# Patient Record
Sex: MALE | Race: BLACK OR AFRICAN AMERICAN | Employment: OTHER | URBAN - METROPOLITAN AREA
[De-identification: names, ages, dates, MRNs, and addresses within clinical notes are randomized per-mention and may not be internally consistent; named-entity substitution may affect disease eponyms.]

---

## 2018-12-07 ENCOUNTER — APPOINTMENT (OUTPATIENT)
Dept: CT IMAGING | Age: 58
End: 2018-12-07
Attending: NURSE PRACTITIONER
Payer: COMMERCIAL

## 2018-12-07 ENCOUNTER — HOSPITAL ENCOUNTER (EMERGENCY)
Age: 58
Discharge: HOME | End: 2018-12-07
Attending: EMERGENCY MEDICINE | Admitting: EMERGENCY MEDICINE
Payer: COMMERCIAL

## 2018-12-07 ENCOUNTER — APPOINTMENT (OUTPATIENT)
Dept: GENERAL RADIOLOGY | Age: 58
End: 2018-12-07
Attending: NURSE PRACTITIONER
Payer: COMMERCIAL

## 2018-12-07 VITALS
SYSTOLIC BLOOD PRESSURE: 127 MMHG | HEART RATE: 77 BPM | OXYGEN SATURATION: 97 % | BODY MASS INDEX: 26.22 KG/M2 | RESPIRATION RATE: 14 BRPM | WEIGHT: 177 LBS | HEIGHT: 69 IN | TEMPERATURE: 97.6 F | DIASTOLIC BLOOD PRESSURE: 90 MMHG
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
## 2018-12-07 DIAGNOSIS — S09.90XA INJURY OF HEAD, INITIAL ENCOUNTER: ICD-10-CM

## 2018-12-07 DIAGNOSIS — R40.20 LOSS OF CONSCIOUSNESS (HCC): ICD-10-CM

## 2018-12-07 PROCEDURE — 93005 ELECTROCARDIOGRAM TRACING: CPT

## 2018-12-07 PROCEDURE — 99284 EMERGENCY DEPT VISIT MOD MDM: CPT

## 2018-12-07 PROCEDURE — 71046 X-RAY EXAM CHEST 2 VIEWS: CPT

## 2018-12-07 PROCEDURE — 70450 CT HEAD/BRAIN W/O DYE: CPT

## 2018-12-08 LAB
ATRIAL RATE: 69 BPM
CALCULATED P AXIS, ECG09: 37 DEGREES
CALCULATED R AXIS, ECG10: 14 DEGREES
CALCULATED T AXIS, ECG11: 32 DEGREES
DIAGNOSIS, 93000: NORMAL
P-R INTERVAL, ECG05: 168 MS
Q-T INTERVAL, ECG07: 396 MS
QRS DURATION, ECG06: 64 MS
QTC CALCULATION (BEZET), ECG08: 424 MS
VENTRICULAR RATE, ECG03: 69 BPM
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
## 2022-12-27 ENCOUNTER — APPOINTMENT (OUTPATIENT)
Dept: GENERAL RADIOLOGY | Age: 62
End: 2022-12-27
Attending: EMERGENCY MEDICINE
Payer: COMMERCIAL

## 2022-12-27 ENCOUNTER — HOSPITAL ENCOUNTER (EMERGENCY)
Age: 62
Discharge: HOME | End: 2022-12-28
Attending: EMERGENCY MEDICINE
Payer: COMMERCIAL

## 2022-12-27 DIAGNOSIS — R07.81 PLEURITIC CHEST PAIN: Primary | ICD-10-CM

## 2022-12-27 LAB
BASOPHILS # BLD: 0.1 K/UL (ref 0–0.1)
BASOPHILS NFR BLD: 1 % (ref 0–1)
COMMENT, HOLDF: NORMAL
D DIMER PPP FEU-MCNC: 2.24 MG/L FEU (ref 0–0.65)
DIFFERENTIAL METHOD BLD: ABNORMAL
EOSINOPHIL # BLD: 0.5 K/UL (ref 0–0.4)
EOSINOPHIL NFR BLD: 7 % (ref 0–7)
ERYTHROCYTE [DISTWIDTH] IN BLOOD BY AUTOMATED COUNT: 16.1 % (ref 11.5–14.5)
HCT VFR BLD AUTO: 47.4 % (ref 36.6–50.3)
HGB BLD-MCNC: 14.5 G/DL (ref 12.1–17)
IMM GRANULOCYTES # BLD AUTO: 0.1 K/UL (ref 0–0.04)
IMM GRANULOCYTES NFR BLD AUTO: 1 % (ref 0–0.5)
LYMPHOCYTES # BLD: 1.1 K/UL (ref 0.8–3.5)
LYMPHOCYTES NFR BLD: 13 % (ref 12–49)
MCH RBC QN AUTO: 27.5 PG (ref 26–34)
MCHC RBC AUTO-ENTMCNC: 30.6 G/DL (ref 30–36.5)
MCV RBC AUTO: 89.8 FL (ref 80–99)
MONOCYTES # BLD: 0.7 K/UL (ref 0–1)
MONOCYTES NFR BLD: 9 % (ref 5–13)
NEUTS SEG # BLD: 5.7 K/UL (ref 1.8–8)
NEUTS SEG NFR BLD: 69 % (ref 32–75)
NRBC # BLD: 0 K/UL (ref 0–0.01)
NRBC BLD-RTO: 0 PER 100 WBC
PLATELET # BLD AUTO: 400 K/UL (ref 150–400)
PMV BLD AUTO: 9.2 FL (ref 8.9–12.9)
RBC # BLD AUTO: 5.28 M/UL (ref 4.1–5.7)
SAMPLES BEING HELD,HOLD: NORMAL
WBC # BLD AUTO: 8.1 K/UL (ref 4.1–11.1)

## 2022-12-27 PROCEDURE — 93005 ELECTROCARDIOGRAM TRACING: CPT

## 2022-12-27 PROCEDURE — 83880 ASSAY OF NATRIURETIC PEPTIDE: CPT

## 2022-12-27 PROCEDURE — 99285 EMERGENCY DEPT VISIT HI MDM: CPT

## 2022-12-27 PROCEDURE — 85025 COMPLETE CBC W/AUTO DIFF WBC: CPT

## 2022-12-27 PROCEDURE — 96374 THER/PROPH/DIAG INJ IV PUSH: CPT

## 2022-12-27 PROCEDURE — 80053 COMPREHEN METABOLIC PANEL: CPT

## 2022-12-27 PROCEDURE — 84484 ASSAY OF TROPONIN QUANT: CPT

## 2022-12-27 PROCEDURE — 74011250636 HC RX REV CODE- 250/636: Performed by: EMERGENCY MEDICINE

## 2022-12-27 PROCEDURE — 36415 COLL VENOUS BLD VENIPUNCTURE: CPT

## 2022-12-27 PROCEDURE — 85379 FIBRIN DEGRADATION QUANT: CPT

## 2022-12-27 PROCEDURE — 71045 X-RAY EXAM CHEST 1 VIEW: CPT

## 2022-12-27 RX ORDER — MORPHINE SULFATE 4 MG/ML
4 INJECTION INTRAVENOUS
Status: COMPLETED | OUTPATIENT
Start: 2022-12-27 | End: 2022-12-27

## 2022-12-27 RX ADMIN — MORPHINE SULFATE 4 MG: 4 INJECTION INTRAVENOUS at 23:23

## 2022-12-28 ENCOUNTER — APPOINTMENT (OUTPATIENT)
Dept: CT IMAGING | Age: 62
End: 2022-12-28
Attending: EMERGENCY MEDICINE
Payer: COMMERCIAL

## 2022-12-28 VITALS
WEIGHT: 165 LBS | DIASTOLIC BLOOD PRESSURE: 84 MMHG | HEIGHT: 69 IN | BODY MASS INDEX: 24.44 KG/M2 | HEART RATE: 91 BPM | TEMPERATURE: 97.7 F | RESPIRATION RATE: 24 BRPM | SYSTOLIC BLOOD PRESSURE: 108 MMHG | OXYGEN SATURATION: 96 %

## 2022-12-28 LAB
ALBUMIN SERPL-MCNC: 3.1 G/DL (ref 3.5–5)
ALBUMIN/GLOB SERPL: 0.6 {RATIO} (ref 1.1–2.2)
ALP SERPL-CCNC: 123 U/L (ref 45–117)
ALT SERPL-CCNC: 51 U/L (ref 12–78)
ANION GAP SERPL CALC-SCNC: 8 MMOL/L (ref 5–15)
AST SERPL-CCNC: ABNORMAL U/L (ref 15–37)
ATRIAL RATE: 101 BPM
BILIRUB SERPL-MCNC: 0.5 MG/DL (ref 0.2–1)
BNP SERPL-MCNC: 33 PG/ML
BUN SERPL-MCNC: 14 MG/DL (ref 6–20)
BUN/CREAT SERPL: 12 (ref 12–20)
CALCIUM SERPL-MCNC: 9.4 MG/DL (ref 8.5–10.1)
CALCULATED P AXIS, ECG09: 45 DEGREES
CALCULATED R AXIS, ECG10: -3 DEGREES
CALCULATED T AXIS, ECG11: 25 DEGREES
CHLORIDE SERPL-SCNC: 106 MMOL/L (ref 97–108)
CO2 SERPL-SCNC: 27 MMOL/L (ref 21–32)
CREAT SERPL-MCNC: 1.18 MG/DL (ref 0.7–1.3)
DIAGNOSIS, 93000: NORMAL
GLOBULIN SER CALC-MCNC: 5.6 G/DL (ref 2–4)
GLUCOSE SERPL-MCNC: 104 MG/DL (ref 65–100)
P-R INTERVAL, ECG05: 156 MS
POTASSIUM SERPL-SCNC: ABNORMAL MMOL/L (ref 3.5–5.1)
PROT SERPL-MCNC: 8.7 G/DL (ref 6.4–8.2)
Q-T INTERVAL, ECG07: 354 MS
QRS DURATION, ECG06: 68 MS
QTC CALCULATION (BEZET), ECG08: 459 MS
SODIUM SERPL-SCNC: 141 MMOL/L (ref 136–145)
TROPONIN-HIGH SENSITIVITY: 9 NG/L (ref 0–76)
VENTRICULAR RATE, ECG03: 101 BPM

## 2022-12-28 PROCEDURE — 74011000636 HC RX REV CODE- 636: Performed by: RADIOLOGY

## 2022-12-28 PROCEDURE — 71275 CT ANGIOGRAPHY CHEST: CPT

## 2022-12-28 RX ORDER — DOXYCYCLINE HYCLATE 100 MG
100 TABLET ORAL 2 TIMES DAILY
Qty: 14 TABLET | Refills: 0 | Status: SHIPPED | OUTPATIENT
Start: 2022-12-28 | End: 2023-01-04

## 2022-12-28 RX ORDER — METHYLPREDNISOLONE 4 MG/1
TABLET ORAL
Qty: 1 DOSE PACK | Refills: 0 | Status: SHIPPED | OUTPATIENT
Start: 2022-12-28

## 2022-12-28 RX ADMIN — IOPAMIDOL 80 ML: 755 INJECTION, SOLUTION INTRAVENOUS at 00:33

## 2022-12-28 NOTE — ED PROVIDER NOTES
HPI   61-year-old man with past medical history of kidney stones and scleroderma presents to the emergency department a left lateral chest pain. Symptoms started around 9 PM.  He describes it as a sharp pain that is constant made worse with deep breathing and movement. He says a week or so ago he was seen in [facility] for similar symptoms. He says he had a negative ACS work-up and was referred to cardiology. He is not sure whether he was tested for a pulmonary emboli. He endorses chronic shortness of breath since summer. No cough. No fevers or chills. No trauma. No history of DVT or PE. He has no history of coronary artery disease. Non-smoker. No leg swelling or calf pain. Past Medical History:   Diagnosis Date    Kidney stone        Past Surgical History:   Procedure Laterality Date    HX HEENT      lasik    HX ORTHOPAEDIC      foot tumor    HX REFRACTIVE SURGERY           No family history on file. Social History     Socioeconomic History    Marital status:      Spouse name: Not on file    Number of children: Not on file    Years of education: Not on file    Highest education level: Not on file   Occupational History    Not on file   Tobacco Use    Smoking status: Never    Smokeless tobacco: Not on file   Substance and Sexual Activity    Alcohol use: No    Drug use: No    Sexual activity: Not on file   Other Topics Concern    Not on file   Social History Narrative    Not on file     Social Determinants of Health     Financial Resource Strain: Not on file   Food Insecurity: Not on file   Transportation Needs: Not on file   Physical Activity: Not on file   Stress: Not on file   Social Connections: Not on file   Intimate Partner Violence: Not on file   Housing Stability: Not on file         ALLERGIES: Patient has no known allergies.     Review of Systems  A complete review of systems was performed and all systems reviewed are negative unless otherwise documented in HPI  Vitals:    12/27/22 2218   BP: (!) 137/91   Pulse: (!) 104   Resp: 24   Temp: 97.7 °F (36.5 °C)   SpO2: (!) 86%   Weight: 74.8 kg (165 lb)   Height: 5' 9\" (1.753 m)            Physical Exam  Constitutional:       Comments: Not acutely distressed or ill-appearing. Not diaphoretic   Eyes:      Extraocular Movements: Extraocular movements intact. Comments: No scleral icterus   Neck:      Vascular: No JVD. Comments: Trachea midline  Cardiovascular:      Comments: Tachycardic. Regular rhythm. No appreciable murmurs. 2+ radial pulses bilaterally  Pulmonary:      Comments: No respiratory distress. Speaks in full sentences without conversational dyspnea. Reproducible left-sided chest wall tenderness to palpation without crepitus or bruising. Breath sounds are present throughout  Abdominal:      Comments: Soft and nontender   Musculoskeletal:         General: Normal range of motion. Cervical back: Normal range of motion. Right lower leg: No edema. Left lower leg: No edema. Skin:     General: Skin is warm and dry. Neurological:      Comments: Awake and alert. GCS 15        MDM  80-year-old man who presents with the above chief complaint. Vitals in triage are notable for pulse ox of 86% on room air. By the time I evaluated the patient he was between 94 and 96% on room air with a good waveform. He is mildly tachycardic. He is not tachypneic he has no conversational dyspnea. His lungs are clear. His reproducible symptoms on palpation. Though ACS work-up will be performed, I have a higher suspicion for potential PE. Disposition pending at this time. EKG shows sinus tachycardia. Rate is 101. Normal intervals. No axis deviation. No concerning ST elevations or depressions. No arrhythmias present. Labs are notable for an elevated D-dimer. CT PE ordered. Chest x-ray and CT scan both show potential edema versus multifocal infection. He has a normal BNP and does not appear volume overloaded.   There was cardiomegaly mention but I think CHF seems less likely and not really consistent with his symptoms. In addition to potential infection, it could be inflammatory in relation to his scleroderma. Going to treat him with doxycycline as well as steroids. He has a pulmonologist he follows with as well so he will be calling them as well as cardiology. He is appropriate for outpatient follow-up. The documented hypoxia seems erroneous as he has been on room air the entire time he has been in his emergency department bed. He was discharged in stable condition.        Procedures

## 2022-12-28 NOTE — DISCHARGE INSTRUCTIONS
Please follow-up with cardiology as planned as well as your pulmonologist.  Take the steroids and antibiotics as directed. Return with any new or worsening symptoms.

## 2022-12-28 NOTE — ED TRIAGE NOTES
Patient reports approximately one hour of left chest pain radiating to left shoulder. Onset while walking to car after eating. Patient with rapid shallow respirations, holding left side of chest with right hand. Hypoxic at 86% on room air. Charge RN notified and preparing room for patient.

## 2022-12-28 NOTE — ED NOTES
Discharge instruction and medications reviewed with patient. Patient verbalized understanding. Patient discharged to self care.

## 2023-02-17 ENCOUNTER — TELEPHONE (OUTPATIENT)
Dept: RHEUMATOLOGY | Age: 63
End: 2023-02-17

## 2023-02-17 ENCOUNTER — OFFICE VISIT (OUTPATIENT)
Dept: RHEUMATOLOGY | Age: 63
End: 2023-02-17
Payer: COMMERCIAL

## 2023-02-17 VITALS
SYSTOLIC BLOOD PRESSURE: 117 MMHG | WEIGHT: 164 LBS | HEART RATE: 113 BPM | RESPIRATION RATE: 16 BRPM | TEMPERATURE: 97.2 F | OXYGEN SATURATION: 92 % | BODY MASS INDEX: 24.22 KG/M2 | DIASTOLIC BLOOD PRESSURE: 79 MMHG

## 2023-02-17 DIAGNOSIS — M34.9 SCLERODERMA (HCC): Primary | ICD-10-CM

## 2023-02-17 PROCEDURE — 99205 OFFICE O/P NEW HI 60 MIN: CPT | Performed by: PEDIATRICS

## 2023-02-17 RX ORDER — MULTIVITAMIN
1 TABLET ORAL DAILY
COMMUNITY

## 2023-02-17 RX ORDER — FOLIC ACID 1 MG/1
TABLET ORAL
COMMUNITY

## 2023-02-17 RX ORDER — ASCORBIC ACID 500 MG
500 TABLET ORAL
COMMUNITY

## 2023-02-17 RX ORDER — OMEPRAZOLE 40 MG/1
CAPSULE, DELAYED RELEASE ORAL
COMMUNITY

## 2023-02-17 RX ORDER — MELATONIN
1000 DAILY
COMMUNITY

## 2023-02-17 RX ORDER — PREDNISONE 5 MG/1
5 TABLET ORAL DAILY
Qty: 30 TABLET | Refills: 1 | Status: SHIPPED | OUTPATIENT
Start: 2023-02-17 | End: 2023-03-19

## 2023-02-17 RX ORDER — GABAPENTIN 300 MG/1
CAPSULE ORAL
COMMUNITY
Start: 2023-02-09

## 2023-02-17 RX ORDER — MYCOPHENOLATE MOFETIL 500 MG/1
500 TABLET ORAL 2 TIMES DAILY
COMMUNITY
Start: 2023-02-14

## 2023-02-17 NOTE — PROGRESS NOTES
Chief Complaint   Patient presents with    Joint Pain     1. Have you been to the ER, urgent care clinic since your last visit? Hospitalized since your last visit? No    2. Have you seen or consulted any other health care providers outside of the [address] [place] [address] since your last visit? Include any pap smears or colon screening.  No

## 2023-02-17 NOTE — TELEPHONE ENCOUNTER
PT is requesting for after visit summary/office notes to be faxed to him when possible. PT provided me with fax number.     P:[phone]

## 2023-02-17 NOTE — TELEPHONE ENCOUNTER
PT is requesting for after visit summary to be faxed to him when possible. PT provided me with fax number.     P:[phone]

## 2023-02-17 NOTE — PROGRESS NOTES
CHIEF COMPLAINT  The patient was sent for rheumatology consultation for evaluation of coughing, SOB, fatigue. HISTORY OF PRESENT ILLNESS  This is a 63 y.o.  male. Today, the patient complains of no pain in the joints. Location: none  Severity:  0 on a scale of 0-10  Timing: none at this time   Duration:  none  Modifying factors:   Context/Associated signs and symptoms: The patient was referred by Dr. Sumeet Ramirez. He was seeing Dr. Sumeet Ramirez for shortness of breath worse with exertion. He had PFTs which showed a moderate restrictive pattern. His chest CT on 12/28/22 showed scattered groundglass and tree in bud opacities. He has had another CT with pulmonology done 2/14/22 which showed dependent ground glass subpleural lower level opacities. He has had tingling in his hands and 1-2 fingers turning blue mainly in the colder weather for several years (4-5 years). He started to have symptoms of hypopigmentation on his right arm and head around 2021. He was evaluated by Dr. Mamie Gentile (dermatology) who did a biopsy and diagnosed him with morphea. He was on methotrexate per Dr. Mamie Gentile for about 5 months, but did not notice a significant improvement in hypopigmentation while on MTX. He was seen by rheumatology a few times, but cannot recall which rheumatologist. His SOB and decreased exercise tolerance started about 9 months ago. He plays golf and noticed a change in his ability to climb steps and walk the golf course around June 2022. He has some muscle stiffness mainly in his calf when he wakes up, but denies any muscle soreness. Also mentions having reflux that started about a year ago. He is currently taking gabapentin for his cough and was started on Cellcept by pulmonology this week.      RHEUMATOLOGY REVIEW OF SYSTEMS   Positives as per HPI  Negatives as follows:  CONSTITUTlONAL:  Denies unexplained persistent fevers, weight change, chronic fatigue  HEAD/EYES:   Denies eye redness, blurry vision or sudden loss of vision, dry eyes, HA, temporal artery pain  ENT:    Denies oral/nasal ulcers, recurrent sinus infections, dry mouth  RESPIRATORY:  No pleuritic pain, history of pleural effusions, hemoptysis  CARDIOVASCULAR:  Denies chest pain, history of pericardial effusions  GASTRO:   Denies heartburn, esophageal dysmotility, abdominal pain, nausea, vomiting, diarrhea, blood in the stool  HEMATOLOGIC:  No easy bruising, purpura, swollen lymph nodes  SKIN:    Denies alopecia, ulcers, nodules, sun sensitivity   VASCULAR:   Denies edema, cyanosis  NEUROLOGIC:  Denies specific muscle weakness, paresthesias   PSYCHIATRIC:  No sleep disturbance / snoring, depression, anxiety  MSK:    No morning stiffness >1 hour, SI joint pain, persistent joint swelling, persistent joint pain    MEDICAL AND SOCIAL HISTORY  This was reviewed with the patient and reviewed in the medical records. Past Medical History:   Diagnosis Date    Kidney stone      Past Surgical History:   Procedure Laterality Date    HX HEENT      lasik    HX ORTHOPAEDIC      foot tumor    HX REFRACTIVE SURGERY       Social History     Tobacco Use    Smoking status: Never   Substance Use Topics    Alcohol use: No    Drug use: No     Employment -   Sleep - Good, no issues  Exercise - yes    FAMILY HISTORY  No autoimmune disease in 1st degree relatives     MEDICATIONS  All the current medications were reviewed in detail. PHYSICAL EXAM  Blood pressure 117/79, pulse (!) 113, temperature 97.2 °F (36.2 °C), temperature source Oral, resp. rate 16, weight 164 lb (74.4 kg), SpO2 92 %. GENERAL APPEARANCE: Well-nourished adult in no acute distress. EYES: No scleral erythema, conjunctival injection. ENT: No oral ulcer, parotid enlargement. NECK: No adenopathy, thyroid enlargement. CARDIOVASCULAR: Heart rhythm is regular. No murmur, rub, gallop. CHEST: Normal vesicular breath sounds. No wheezes, rales, pleural friction rubs.   ABDOMINAL: The abdomen is soft and nontender. Liver and spleen are nonpalpable. Bowel sounds are normal.   EXTREMITIES: There is no evidence of clubbing, cyanosis, edema. SKIN: No rash, palpable purpura, digital ulcer, abnormal thickening. Nail fold changes with cuticle hypertrophy, dilation drop out bilaerally. Gottron's type rash on the MCPs. Telangiectasia noted on face. NEUROLOGICAL: Normal gait and station, full strength in upper and lower extremities, normal sensation to light touch. MUSCULOSKELETAL:   Upper extremities - full range of motion, no tenderness, no swelling, no synovial thickening and no deformity of joints except decreased  strength bilaterally  Lower extremities - full range of motion, no tenderness, no swelling, no synovial thickening and no deformity of joints.      MMT    Upper Extremity Strength    Neck   Flexion Extension     5  5       Right  Left  Deltoids  5  5    Bicep   5  5   Triceps   5  5  Wrist Extension 5  5  Wrist Flexion  5  5  Finger Flexion  5  5  Finger Extension 5  5    Lower Extremity Strength       Right  Left  Hip Flexion  5  5  Hip Extension  5  5  Knee Flexion  5  5  Knee Extension 5  5  Plantar Flexion 5  5  Dorsiflexion  5  5    Raising arms above head:    yes  Sitting up from seated position without assistance:  yes  Sitting up from 6 inch stool:     N/A  Sitting up from squat:      N/A  Walking on tip toes:      N/A  Walking on heels:      N/A     SKIN    Heliotrope Rash:   {yes/ no default no:19426}  Upper Arm Erythema:   {yes/ no default no:19426}  Shawl Sign:    {yes/ no default no:19426}  V-sign:     {yes/ no default no:19426}  Holster sign:    {yes/ no default no:19426}  Gottron's papules:   {yes/ no default no:19426}  Gottron's sign:    {yes/ no default no:19426}  Calcinosis:    {yes/ no default no:19426}  Raynaud's Phenomenon:  {yes/ no default no:19426}  's Hands:   {yes/ no default no:19426}  Periungual erythema:   {yes/ no default no:19426}  Abnormal Nailfold Capillaries:  {yes/ no default no:19426}  Livedo Reticularis:   {yes/ no default no:39062}  Scalp Erythema:   {yes/ no default no:05700}    LABS, RADIOLOGY AND PROCEDURES  Previous labs reviewed -Yes  Previous radiology reviewed -Yes  Previous procedures reviewed -Yes  Previous medical records reviewed/summarized -Yes    CT CHEST 2/14/2023  IMPRESSION:  No significant change from previous. Dependent groundglass subpleural lower lobe opacity, edema versus inflammation possibly but less likely fibrosis. No evidence of honeycombing. Bilateral basilar lower lobe and right middle lobe cylindrical bronchiectasis. Gynecomastia. Prominent lymph nodes. Enlarged main pulmonary artery. Prominent caliber ascending aorta and aortic root; consider sonographic assessment of aortic root and aortic valve. Stable appearance of left thyroid nodule. CTA CHEST W OR WO CONTRAST 12/28/2022  IMPRESSION:  There is no pulmonary embolism. There is no aortic aneurysm or dissection. Cardiomegaly with scattered groundglass and tree-in-bud opacities, imaging findings may be related to pulmonary edema, alternatively a multifocal infectious process. There is a small hiatal hernia. ASSESSMENT  1. Scleroderma / dermatomyositis overlap - raynauds, cuticle hypertrophy, dROM in fingers, reflux, telangiectasia, SOB, chest CT showing ground glass opacities - The patient has symptoms more consistent with dermatomyositis. I am also concerned about interstitial lung disease and pulmonary hypertension. I asked him to get me the results the biopsy done with Dr. Juventino Martinez. I will also try and obtain lab work done with Dr. Diana Tsang. Once I have these results I will order my own lab work. I recommend he have an esophogram, muscle enzymes, and antibodies for scleroderma and dermatomyositis checked. He has already had PFTs, echocardiogram, and chest CT done and we need to get the results. We may consider a right heart cath.  I will order an esophogram today and start him on prednisone 2.5 mg daily due to his cough. 2. Raynaud's phenomenon - the patient most likely has Raynaud's, which is caused by a vasospasm of the small vessels of the hands on cold exposure. Air conditioning in the summer as well as stress can also trigger an episode. There is a history of pallor, cyanosis and rubor. This can be seen in up to 10% of the normal population (primary Raynaud's). Secondary Raynaud's is when this is associated with another connective tissue disease and can result in tissue damage (digital ulcers or gangrene). The treatment is to keep the core body warm as well as to wear gloves, handwarmers and socks. Multiple layers of clothing is recommended. Medications such as calcium channel blockers and SSRI's  are usually not necessary unless we began to see tissue damage or persistent pain. PLAN  1. Obtain results of skin biopsy, PFTs, and lab work   2. Will order additional labs based on previous labs done  3. Esophogram ordered  4. Prednisone 2.5 mg daily for cough    Nick Shelton MD  Adult and Pediatric Rheumatology     [facility], [address] [place] [address], Phone [phone], Fax [phone]    Visiting  of Pediatrics    Department of Pediatrics, [facility] of [address] [place] [address], [address] [place] [address], Phone [phone], Fax [phone]    There are no Patient Instructions on file for this visit. cc:  MD Dr. Roberta Parham - pulmonology  Dr. Luis A Orellana - dermatology    Jakob Morgan MD, personally performed the services described in the documentation as scribed by Milton Harrison in my presence and have reviewed and agree with the note as scribed.

## 2023-03-15 DIAGNOSIS — M34.9 SCLERODERMA (HCC): Primary | ICD-10-CM

## 2023-03-15 RX ORDER — TOCILIZUMAB 180 MG/ML
162 INJECTION, SOLUTION SUBCUTANEOUS
Qty: 2 EACH | Refills: 3 | Status: SHIPPED | OUTPATIENT
Start: 2023-03-15

## 2023-03-21 ENCOUNTER — TELEPHONE (OUTPATIENT)
Dept: RHEUMATOLOGY | Age: 63
End: 2023-03-21

## 2023-03-21 NOTE — TELEPHONE ENCOUNTER
----- Message from Nhan Hanna MD sent at 3/15/2023  4:49 PM EDT -----  I have reviewed the labs. We need a TB test for approval of actemra for his lung disease. I also saw no lab abnormalities in the previous labs done. I added a few things that I want but it wont change the treatment plan of actemra for scleroderma lung disease.

## 2023-03-21 NOTE — TELEPHONE ENCOUNTER
Spoke to pt informed pt per Dr Carline Beatty message below   I have reviewed the labs. We need a TB test for approval of actemra for his lung disease. I also saw no lab abnormalities in the previous labs done.  I added a few things that I want but it wont change the treatment plan of actemra for scleroderma lung disease  Pt verbally acknowledged understanding also pt asked if the lab orders can be faxed to him, pt was informed the fax number was faxed to the number provided by the patient

## 2023-03-22 ENCOUNTER — TELEPHONE (OUTPATIENT)
Dept: RHEUMATOLOGY | Age: 63
End: 2023-03-22

## 2023-03-22 NOTE — TELEPHONE ENCOUNTER
PT stated that he was faxed documents, he's asking for them to be re faxed or if he can pick them up. PT is also asking for a doctors note or accompanying letter that came with the documents. PT provided fax number.      Y:[phone]

## 2023-03-22 NOTE — TELEPHONE ENCOUNTER
PT left VM asking for a call back. I called the PT and left a VM asking for PT to call back, I provided our office's number.

## 2023-03-24 ENCOUNTER — TELEPHONE (OUTPATIENT)
Dept: RHEUMATOLOGY | Age: 63
End: 2023-03-24

## 2023-03-24 NOTE — TELEPHONE ENCOUNTER
[facility] called and stated that they needed recent office notes and labs for the PT and they'd like them faxed over. Rep wanted me to stress that the PT is currently in office.      F:[phone]

## 2023-03-27 DIAGNOSIS — M34.9 SCLERODERMA (HCC): Primary | ICD-10-CM

## 2023-03-28 NOTE — TELEPHONE ENCOUNTER
Spoke to pt informed pt per Dr Latoya Buitrago that the TB test came back indeterminate, pt was informed per Dr Latoya Buitrago that it needs to be redone. Pt was informed that the lab order has been faxed to the fax number provided.  Pt verbally acknowledged understanding

## 2023-03-29 ENCOUNTER — TELEPHONE (OUTPATIENT)
Dept: RHEUMATOLOGY | Age: 63
End: 2023-03-29

## 2023-03-30 ENCOUNTER — TELEPHONE (OUTPATIENT)
Dept: RHEUMATOLOGY | Age: 63
End: 2023-03-30

## 2023-03-30 NOTE — TELEPHONE ENCOUNTER
Spoke to pt informed pt that the labs has not been resulted yet, and as soon the results are in the they will be submitted so that the PA can be completed.  Pt verbally acknowledged understanding

## 2023-03-30 NOTE — TELEPHONE ENCOUNTER
Pt's wife called and would like to speak with [facility] regarding the PT's medicine and insurance, wife also provided phone number.      P:[phone]

## 2023-03-31 ENCOUNTER — TELEPHONE (OUTPATIENT)
Dept: RHEUMATOLOGY | Age: 63
End: 2023-03-31

## 2023-03-31 NOTE — TELEPHONE ENCOUNTER
Left message with [facility] regarding lab results, I informed them that they hadn't been resulted yet and when they have been they'd be faxed over.

## 2023-04-02 LAB
GAMMA INTERFERON BACKGROUND BLD IA-ACNC: 0.04 IU/ML
M TB IFN-G BLD-IMP: ABNORMAL
M TB IFN-G CD4+ T-CELLS BLD-ACNC: 0.03 IU/ML
M TBIFN-G CD4+ CD8+T-CELLS BLD-ACNC: 0 IU/ML
MITOGEN IGNF BLD-ACNC: 0.16 IU/ML
QUANTIFERON INCUBATION, QF1T: ABNORMAL
SERVICE CMNT-IMP: ABNORMAL

## 2023-04-03 ENCOUNTER — TELEPHONE (OUTPATIENT)
Dept: RHEUMATOLOGY | Age: 63
End: 2023-04-03

## 2023-04-03 NOTE — TELEPHONE ENCOUNTER
----- Message from Juan Manuel Bello MD sent at 4/3/2023  2:11 PM EDT -----  TB indeterminate. Can he do a PPD with the PCP.

## 2023-04-04 NOTE — TELEPHONE ENCOUNTER
Spoke to pt informed pt per Dr Paulina Beltrán message below   TB indeterminate. Can he do a PPD with the PCP.   Pt verbally acknowledged understanding

## 2023-04-05 LAB
ANA HOMOGEN TITR SER: ABNORMAL {TITER}
ANA SER QL IF: POSITIVE
ANTI-MDA-5 AB (CADM-140)(RDL): <20 UNITS
ANTI-NXP-2 (P140) AB (RDL): <20 UNITS
EJ AB SER QL: NEGATIVE
ENA JO1 AB SER IA-ACNC: <20 UNITS
ENA PM/SCL AB SER-ACNC: <20 UNITS
ENA SCL70 AB SER QL IA: <20 UNITS
ENA SS-A 52KD IGG SER IA-ACNC: <20 UNITS
FIBRILLARIN AB SER QL: NEGATIVE
GAMMA INTERFERON BACKGROUND BLD IA-ACNC: 0.01 IU/ML
KU AB SER QL: NEGATIVE
LABORATORY COMMENT REPORT: ABNORMAL
M TB IFN-G BLD-IMP: ABNORMAL
M TB IFN-G CD4+ T-CELLS BLD-ACNC: 0.01 IU/ML
M TBIFN-G CD4+ CD8+T-CELLS BLD-ACNC: 0.01 IU/ML
MI2 AB SER QL: NEGATIVE
MITOGEN IGNF BLD-ACNC: 0.23 IU/ML
OJ AB SER QL: NEGATIVE
PL12 AB SER QL: NEGATIVE
PL7 AB SER QL: NEGATIVE
QUANTIFERON INCUBATION, QF1T: ABNORMAL
RNAP III IGG SERPL IA-ACNC: <20 UNITS
SERVICE CMNT-IMP: ABNORMAL
SRP AB SERPL QL: NEGATIVE
TIF1-GAMMA AB SER IA-ACNC: <20 UNITS
U1 SNRNP AB SER IA-ACNC: <20 UNITS
U2 SNRNP AB SER QL: NEGATIVE

## 2023-04-19 ENCOUNTER — OFFICE VISIT (OUTPATIENT)
Dept: RHEUMATOLOGY | Age: 63
End: 2023-04-19
Payer: COMMERCIAL

## 2023-04-19 VITALS
RESPIRATION RATE: 16 BRPM | SYSTOLIC BLOOD PRESSURE: 130 MMHG | DIASTOLIC BLOOD PRESSURE: 84 MMHG | BODY MASS INDEX: 24.07 KG/M2 | TEMPERATURE: 97.4 F | HEART RATE: 101 BPM | WEIGHT: 163 LBS | OXYGEN SATURATION: 90 %

## 2023-04-19 DIAGNOSIS — M34.9 SCLERODERMA (HCC): Primary | ICD-10-CM

## 2023-04-19 PROCEDURE — 99214 OFFICE O/P EST MOD 30 MIN: CPT | Performed by: PEDIATRICS

## 2023-04-19 RX ORDER — TOCILIZUMAB 180 MG/ML
162 INJECTION, SOLUTION SUBCUTANEOUS
Qty: 6 EACH | Refills: 0 | Status: SHIPPED | COMMUNITY
Start: 2023-04-19 | End: 2023-04-26 | Stop reason: SDUPTHER

## 2023-04-19 RX ORDER — PREDNISONE 5 MG/1
5 TABLET ORAL DAILY
COMMUNITY
Start: 2023-03-23

## 2023-04-19 RX ORDER — ZINC SULFATE 66 MG
TABLET ORAL
COMMUNITY

## 2023-04-19 NOTE — PROGRESS NOTES
Chief Complaint   Patient presents with    Joint Pain     1. Have you been to the ER, urgent care clinic since your last visit? Hospitalized since your last visit? No    2. Have you seen or consulted any other health care providers outside of the [address] [place] [address] since your last visit? Include any pap smears or colon screening.  No

## 2023-04-19 NOTE — PROGRESS NOTES
RHEUMATOLOGY PROBLEM LIST AND CHIEF COMPLAINT  1. Scleroderma / dermatomyositis overlap - raynauds, cuticle hypertrophy, dROM in fingers, reflux, telangiectasia, SOB, chest CT showing ground glass opacities, skin biopsy with fibrosis    INTERVAL HISTORY  This is a 63 y.o.  male. Today, the patient complains of pain in the joints. Location: none  Severity:  0 on a scale of 0-10  Timing: none at this time   Duration:  2 months  Modifying factors:   Context/Associated signs and symptoms: At the patient's last visit we started him on prednisone daily for cough. He is currently taking 5 mg daily which has helped some with his cough at night. He continues to have a daily cough during the day. Overall, he states that he feels the same as his last visit. He continues to feel fatigued, short of breath, and have a cough. We started approval for Actemra, but he is currently between insurances so we have not gotten approval yet. At his last visit I ordered an esophagram, but I do not have the results. He thinks he had this done with Dr. Ang Duke (GI) at [facility]. Myositis panel 3/21/23 was negative including RNA polymerase III. His MG was positive 1:160.      RHEUMATOLOGY REVIEW OF SYSTEMS   Positives as per HPI  Negatives as follows:  Wilson José:  Denies unexplained persistent fevers, weight change  HEAD/EYES:   Denies eye redness, blurry vision or sudden loss of vision, dry eyes, HA, temporal artery pain  ENT:    Denies oral/nasal ulcers, recurrent sinus infections, dry mouth  RESPIRATORY:  No pleuritic pain, history of pleural effusions, hemoptysis  CARDIOVASCULAR:  Denies chest pain, history of pericardial effusions  GASTRO:   Denies heartburn, esophageal dysmotility, abdominal pain, nausea, vomiting, diarrhea, blood in the stool  HEMATOLOGIC:  No easy bruising, purpura, swollen lymph nodes  SKIN:    Denies alopecia, ulcers, nodules, sun sensitivity   VASCULAR:   Denies edema, cyanosis  NEUROLOGIC:  Denies specific muscle weakness, paresthesias   PSYCHIATRIC:  No sleep disturbance / snoring, depression, anxiety  MSK:    No morning stiffness >1 hour, SI joint pa    MEDICAL AND SOCIAL HISTORY  This was reviewed with the patient and reviewed in the medical records. Past Medical History:   Diagnosis Date    Kidney stone      Past Surgical History:   Procedure Laterality Date    HX HEENT      lasik    HX ORTHOPAEDIC      foot tumor    HX REFRACTIVE SURGERY       Social History     Tobacco Use    Smoking status: Never   Substance Use Topics    Alcohol use: No    Drug use: No     Employment -   Sleep - Good, no issues  Exercise - yes    FAMILY HISTORY  No autoimmune disease in 1st degree relatives     MEDICATIONS  All the current medications were reviewed in detail. PHYSICAL EXAM  Blood pressure 130/84, pulse (!) 101, temperature 97.4 °F (36.3 °C), temperature source Oral, resp. rate 16, weight 163 lb (73.9 kg), SpO2 90 %. GENERAL APPEARANCE: Well-nourished adult in no acute distress with occasional coughing   EYES: No scleral erythema, conjunctival injection. ENT: No oral ulcer, parotid enlargement. NECK: No adenopathy, thyroid enlargement. CARDIOVASCULAR: Heart rhythm is regular. No murmur, rub, gallop. CHEST: No wheezes, bibasilar crackles,  no pleural friction rubs. ABDOMINAL: The abdomen is soft and nontender. Liver and spleen are nonpalpable. Bowel sounds are normal.   EXTREMITIES: There is no evidence of clubbing, cyanosis, edema. SKIN: Nail fold changes with cuticle hypertrophy, dilation drop out bilaerally. Gottron's type rash on the MCPs - improved. Telangiectasia noted on face. Hypopigmentation with waxy skin appearance on arms, legs, scalp. NEUROLOGICAL: Normal gait and station, full strength in upper and lower extremities, normal sensation to light touch.   MUSCULOSKELETAL:   Upper extremities - decreased  strength bilaterally, puffy fingers   Lower extremities - full range of motion, no tenderness, no swelling, no synovial thickening and no deformity of joints. MMT    Upper Extremity Strength    Neck   Flexion Extension     5  5       Right  Left  Deltoids  5  5    Bicep   5  5   Triceps   5  5  Wrist Extension 5  5  Wrist Flexion  5  5  Finger Flexion  5  5  Finger Extension 5  5    Lower Extremity Strength       Right  Left  Hip Flexion  5  5  Hip Extension  5  5  Knee Flexion  5  5  Knee Extension 5  5  Plantar Flexion 5  5  Dorsiflexion  5  5    Raising arms above head:    yes  Sitting up from seated position without assistance:  yes  Sitting up from 6 inch stool:     N/A  Sitting up from squat:      N/A  Walking on tip toes:      N/A  Walking on heels:      N/A     SKIN    Heliotrope Rash:   no  Upper Arm Erythema:   no  Shawl Sign:    no  V-sign:     no  Holster sign:    no  Gottron's papules:   no  Gottron's sign:    yes  Calcinosis:    no  Raynaud's Phenomenon:  yes  's Hands:   no  Periungual erythema:   yes  Abnormal Nailfold Capillaries:  yes  Livedo Reticularis:   no  Scalp Erythema:   no    LABS, RADIOLOGY AND PROCEDURES  Previous labs reviewed -Yes  Previous radiology reviewed -Yes  Previous procedures reviewed -Yes  Previous medical records reviewed/summarized -Yes    RECEIVED LABS - SSB, SSA, SCL-70, Abby-1, ANCA / MPO / PR3, MG, CCP, RF, CEP-1, 14.3.3 ETA, anti-centromere - all negative  Aldolase, ACE, CK, CRP, CMP - all normal    Abnormal values:    - mildly elevated  IgG 1912  ESR 41    CT CHEST 2/14/2023  IMPRESSION:  No significant change from previous. Dependent groundglass subpleural lower lobe opacity, edema versus inflammation possibly but less likely fibrosis. No evidence of honeycombing. Bilateral basilar lower lobe and right middle lobe cylindrical bronchiectasis. Gynecomastia. Prominent lymph nodes. Enlarged main pulmonary artery.  Prominent caliber ascending aorta and aortic root; consider sonographic assessment of aortic root and aortic valve. Stable appearance of left thyroid nodule. CTA CHEST W OR WO CONTRAST 12/28/2022  IMPRESSION:  There is no pulmonary embolism. There is no aortic aneurysm or dissection. Cardiomegaly with scattered groundglass and tree-in-bud opacities, imaging findings may be related to pulmonary edema, alternatively a multifocal infectious process. There is a small hiatal hernia. ASSESSMENT  1. Scleroderma / dermatomyositis overlap - The patient continues to have cough, fatigue, and shortness of breath. We will start him on Actemra subQ weekly (6 samples provided). He should continue with prednisone 5 mg daily. No labs needed today  2. Raynaud's phenomenon - the patient most likely has Raynaud's, which is caused by a vasospasm of the small vessels of the hands on cold exposure. Air conditioning in the summer as well as stress can also trigger an episode. There is a history of pallor, cyanosis and rubor. This can be seen in up to 10% of the normal population (primary Raynaud's). Secondary Raynaud's is when this is associated with another connective tissue disease and can result in tissue damage (digital ulcers or gangrene). The treatment is to keep the core body warm as well as to wear gloves, handwarmers and socks. Multiple layers of clothing is recommended. Medications such as calcium channel blockers and SSRI's  are usually not necessary unless we began to see tissue damage or persistent pain. PLAN  1. Start Actemra subQ weekly - 6 samples provided  2. Prednisone 5 mg daily   3. Follow up in 1-2 months    Nick Kurtz MD  Adult and Pediatric Rheumatology     [facility], [address] [place] [address], Phone [phone], Fax [phone]    Visiting  of Pediatrics    Department of Pediatrics, [facility] of [address] [place] [address], [address] [place] [address], Phone [phone], Fax [phone]    There are no Patient Instructions on file for this visit. cc:  MD Dr. Heather Adame - pulmonology  Dr. Michael Su - dermatology    Maribeth Cummings MD, personally performed the services described in the documentation as scribed by Pablito Armijo in my presence and have reviewed and agree with the note as scribed.

## 2023-04-25 ENCOUNTER — TELEPHONE (OUTPATIENT)
Dept: RHEUMATOLOGY | Age: 63
End: 2023-04-25

## 2023-04-25 NOTE — TELEPHONE ENCOUNTER
PT's wife called and updated the insurance. Wife stated that a PA is needed for the Actemra and would like to speak with someone about it.

## 2023-04-25 NOTE — TELEPHONE ENCOUNTER
Left a voicemail to have pt or pt wife give the office a return call to update the insurance information.

## 2023-04-26 RX ORDER — TOCILIZUMAB 180 MG/ML
162 INJECTION, SOLUTION SUBCUTANEOUS
Qty: 6 EACH | Refills: 3 | Status: ACTIVE | OUTPATIENT
Start: 2023-04-26

## 2023-04-27 NOTE — PROGRESS NOTES
[facility] Update    Date: 04/27/23    Actemra was routed to the mandated specialty pharmacy [facility]. Prior authorization has been approved through [facility]-. Pharmacy will inform patient and provide them with dispensing pharmacy's phone number. Please call us with any questions at  [phone].
F25.0

## 2023-05-01 PROBLEM — E66.3 OVERWEIGHT WITH BODY MASS INDEX (BMI) 25.0-29.9: Status: ACTIVE | Noted: 2023-05-01

## 2023-05-01 PROBLEM — R05.3 CHRONIC COUGH: Status: ACTIVE | Noted: 2023-05-01

## 2023-05-01 PROBLEM — E04.1 THYROID NODULE: Status: ACTIVE | Noted: 2023-05-01

## 2023-05-01 PROBLEM — M35.9 AUTOIMMUNE DISEASE (HCC): Status: ACTIVE | Noted: 2023-05-01

## 2023-05-01 PROBLEM — L94.0 MORPHEA: Status: ACTIVE | Noted: 2023-05-01

## 2023-05-01 PROBLEM — R59.0 MEDIASTINAL ADENOPATHY: Status: ACTIVE | Noted: 2023-05-01

## 2023-05-01 PROBLEM — N20.0 KIDNEY STONE: Status: ACTIVE | Noted: 2023-05-01

## 2023-05-04 ENCOUNTER — TELEPHONE (OUTPATIENT)
Dept: RHEUMATOLOGY | Age: 63
End: 2023-05-04

## 2023-05-25 ENCOUNTER — TELEPHONE (OUTPATIENT)
Age: 63
End: 2023-05-25

## 2023-05-25 NOTE — TELEPHONE ENCOUNTER
PT's wife called and requested for the MG for the PT's Actemra, wife stated that they were currently at the doctor and the information was needed. I informed the PT per the prescription that it was 162 MG/0.9ML and the wife stated she understood.

## 2023-06-16 ENCOUNTER — OFFICE VISIT (OUTPATIENT)
Age: 63
End: 2023-06-16
Payer: COMMERCIAL

## 2023-06-16 VITALS
RESPIRATION RATE: 16 BRPM | SYSTOLIC BLOOD PRESSURE: 120 MMHG | HEART RATE: 102 BPM | BODY MASS INDEX: 22.74 KG/M2 | DIASTOLIC BLOOD PRESSURE: 79 MMHG | TEMPERATURE: 97.9 F | WEIGHT: 154 LBS | OXYGEN SATURATION: 92 %

## 2023-06-16 DIAGNOSIS — M34.9 SYSTEMIC SCLEROSIS, UNSPECIFIED (HCC): Primary | ICD-10-CM

## 2023-06-16 PROCEDURE — 99214 OFFICE O/P EST MOD 30 MIN: CPT | Performed by: PEDIATRICS

## 2023-06-16 RX ORDER — AZITHROMYCIN 500 MG/1
TABLET, FILM COATED ORAL
COMMUNITY
Start: 2023-05-26

## 2023-06-16 RX ORDER — PREDNISONE 5 MG/1
5 TABLET ORAL 2 TIMES DAILY
Qty: 60 TABLET | Refills: 1 | Status: SHIPPED | OUTPATIENT
Start: 2023-06-16

## 2023-06-16 ASSESSMENT — PATIENT HEALTH QUESTIONNAIRE - PHQ9
SUM OF ALL RESPONSES TO PHQ QUESTIONS 1-9: 0
SUM OF ALL RESPONSES TO PHQ QUESTIONS 1-9: 0
1. LITTLE INTEREST OR PLEASURE IN DOING THINGS: 0
SUM OF ALL RESPONSES TO PHQ9 QUESTIONS 1 & 2: 0
SUM OF ALL RESPONSES TO PHQ QUESTIONS 1-9: 0
SUM OF ALL RESPONSES TO PHQ QUESTIONS 1-9: 0
2. FEELING DOWN, DEPRESSED OR HOPELESS: 0

## 2023-06-27 ENCOUNTER — TELEPHONE (OUTPATIENT)
Age: 63
End: 2023-06-27

## 2023-06-27 NOTE — TELEPHONE ENCOUNTER
PT called and stated that Francis Astudillo stated that he was going to review the PT's chest xrays and they'd discuss it when he called in 10 days, PT also stated that he was not feeling better. PT is requesting call back from Retia Nyhan.

## 2023-07-03 ENCOUNTER — TELEPHONE (OUTPATIENT)
Age: 63
End: 2023-07-03

## 2023-07-03 DIAGNOSIS — J84.9 ILD (INTERSTITIAL LUNG DISEASE) (HCC): ICD-10-CM

## 2023-07-03 DIAGNOSIS — M34.9 SYSTEMIC SCLEROSIS, UNSPECIFIED (HCC): Primary | ICD-10-CM

## 2023-07-03 NOTE — TELEPHONE ENCOUNTER
PT's wife called and stated that the PT needs to speak with  regarding CT scan and how he feels on Prednisone. Wife stated that they've been calling but haven't received a call back, I informed her that Amy Number called on 6/28/23 but couldn't leave a VM due to the VM being full. I also informed her that Amy Number was out until the 14th and she stated she understood, Wife requested  a call back.

## 2023-07-05 NOTE — TELEPHONE ENCOUNTER
Spoke to pt informed pt per Dr Kelly Lawrence message below   ordered a CT of the lungs. He is on 10mg of prednisone but not really responding to that or the actemra. Let him or his wife know that I ordered a CT scan and will likely switch him to rituximab infusion unless the CT shows significant improvement. If it does we will increase the steroids and then taper those and let actemra work.    Pt verbally acknowledged understanding

## 2023-07-08 ENCOUNTER — HOSPITAL ENCOUNTER (OUTPATIENT)
Facility: HOSPITAL | Age: 63
Setting detail: OBSERVATION
Discharge: HOME | End: 2023-07-10
Attending: EMERGENCY MEDICINE | Admitting: HOSPITALIST
Payer: COMMERCIAL

## 2023-07-08 DIAGNOSIS — K92.0 HEMATEMESIS, UNSPECIFIED WHETHER NAUSEA PRESENT: Primary | ICD-10-CM

## 2023-07-08 PROBLEM — K92.2 GIB (GASTROINTESTINAL BLEEDING): Status: ACTIVE | Noted: 2023-07-08

## 2023-07-08 LAB
ALBUMIN SERPL-MCNC: 3.3 G/DL (ref 3.5–5)
ALBUMIN/GLOB SERPL: 0.9 (ref 1.1–2.2)
ALP SERPL-CCNC: 114 U/L (ref 45–117)
ALT SERPL-CCNC: 53 U/L (ref 12–78)
ANION GAP SERPL CALC-SCNC: 3 MMOL/L (ref 5–15)
AST SERPL-CCNC: 44 U/L (ref 15–37)
BASOPHILS # BLD: 0.1 K/UL (ref 0–0.1)
BASOPHILS NFR BLD: 1 % (ref 0–1)
BILIRUB SERPL-MCNC: 0.4 MG/DL (ref 0.2–1)
BUN SERPL-MCNC: 18 MG/DL (ref 6–20)
BUN/CREAT SERPL: 15 (ref 12–20)
CALCIUM SERPL-MCNC: 9.5 MG/DL (ref 8.5–10.1)
CHLORIDE SERPL-SCNC: 110 MMOL/L (ref 97–108)
CO2 SERPL-SCNC: 28 MMOL/L (ref 21–32)
COMMENT:: NORMAL
COMMENT:: NORMAL
CREAT SERPL-MCNC: 1.22 MG/DL (ref 0.7–1.3)
DIFFERENTIAL METHOD BLD: ABNORMAL
EOSINOPHIL # BLD: 0.9 K/UL (ref 0–0.4)
EOSINOPHIL NFR BLD: 12 % (ref 0–7)
ERYTHROCYTE [DISTWIDTH] IN BLOOD BY AUTOMATED COUNT: 15.3 % (ref 11.5–14.5)
GASTROCULT GAST QL: POSITIVE
GLOBULIN SER CALC-MCNC: 3.7 G/DL (ref 2–4)
GLUCOSE SERPL-MCNC: 106 MG/DL (ref 65–100)
HCT VFR BLD AUTO: 49.8 % (ref 36.6–50.3)
HCT VFR BLD AUTO: 50.9 % (ref 36.6–50.3)
HEMOCCULT STL QL: NEGATIVE
HGB BLD-MCNC: 15.8 G/DL (ref 12.1–17)
HGB BLD-MCNC: 16.3 G/DL (ref 12.1–17)
IMM GRANULOCYTES # BLD AUTO: 0 K/UL (ref 0–0.04)
IMM GRANULOCYTES NFR BLD AUTO: 0 % (ref 0–0.5)
LYMPHOCYTES # BLD: 1 K/UL (ref 0.8–3.5)
LYMPHOCYTES NFR BLD: 13 % (ref 12–49)
MCH RBC QN AUTO: 28.5 PG (ref 26–34)
MCHC RBC AUTO-ENTMCNC: 31.7 G/DL (ref 30–36.5)
MCV RBC AUTO: 89.9 FL (ref 80–99)
MONOCYTES # BLD: 0.9 K/UL (ref 0–1)
MONOCYTES NFR BLD: 12 % (ref 5–13)
NEUTS SEG # BLD: 4.7 K/UL (ref 1.8–8)
NEUTS SEG NFR BLD: 62 % (ref 32–75)
NRBC # BLD: 0 K/UL (ref 0–0.01)
NRBC BLD-RTO: 0 PER 100 WBC
PH GAST: 4 (ref 1.5–3.5)
PLATELET # BLD AUTO: 185 K/UL (ref 150–400)
PMV BLD AUTO: 10.4 FL (ref 8.9–12.9)
POTASSIUM SERPL-SCNC: 4.4 MMOL/L (ref 3.5–5.1)
PROT SERPL-MCNC: 7 G/DL (ref 6.4–8.2)
RBC # BLD AUTO: 5.54 M/UL (ref 4.1–5.7)
SODIUM SERPL-SCNC: 141 MMOL/L (ref 136–145)
SPECIMEN HOLD: NORMAL
SPECIMEN HOLD: NORMAL
TROPONIN I SERPL HS-MCNC: 21 NG/L (ref 0–76)
WBC # BLD AUTO: 7.6 K/UL (ref 4.1–11.1)

## 2023-07-08 PROCEDURE — A4216 STERILE WATER/SALINE, 10 ML: HCPCS | Performed by: STUDENT IN AN ORGANIZED HEALTH CARE EDUCATION/TRAINING PROGRAM

## 2023-07-08 PROCEDURE — 6370000000 HC RX 637 (ALT 250 FOR IP): Performed by: STUDENT IN AN ORGANIZED HEALTH CARE EDUCATION/TRAINING PROGRAM

## 2023-07-08 PROCEDURE — 6360000002 HC RX W HCPCS: Performed by: EMERGENCY MEDICINE

## 2023-07-08 PROCEDURE — 94761 N-INVAS EAR/PLS OXIMETRY MLT: CPT

## 2023-07-08 PROCEDURE — 85018 HEMOGLOBIN: CPT

## 2023-07-08 PROCEDURE — 2580000003 HC RX 258: Performed by: HOSPITALIST

## 2023-07-08 PROCEDURE — 2580000003 HC RX 258: Performed by: STUDENT IN AN ORGANIZED HEALTH CARE EDUCATION/TRAINING PROGRAM

## 2023-07-08 PROCEDURE — 1100000000 HC RM PRIVATE

## 2023-07-08 PROCEDURE — A4216 STERILE WATER/SALINE, 10 ML: HCPCS | Performed by: EMERGENCY MEDICINE

## 2023-07-08 PROCEDURE — 82271 OCCULT BLOOD OTHER SOURCES: CPT

## 2023-07-08 PROCEDURE — 93005 ELECTROCARDIOGRAM TRACING: CPT | Performed by: EMERGENCY MEDICINE

## 2023-07-08 PROCEDURE — C9113 INJ PANTOPRAZOLE SODIUM, VIA: HCPCS | Performed by: EMERGENCY MEDICINE

## 2023-07-08 PROCEDURE — 2580000003 HC RX 258: Performed by: EMERGENCY MEDICINE

## 2023-07-08 PROCEDURE — 6360000002 HC RX W HCPCS: Performed by: STUDENT IN AN ORGANIZED HEALTH CARE EDUCATION/TRAINING PROGRAM

## 2023-07-08 PROCEDURE — C9113 INJ PANTOPRAZOLE SODIUM, VIA: HCPCS | Performed by: STUDENT IN AN ORGANIZED HEALTH CARE EDUCATION/TRAINING PROGRAM

## 2023-07-08 PROCEDURE — 85014 HEMATOCRIT: CPT

## 2023-07-08 PROCEDURE — 85025 COMPLETE CBC W/AUTO DIFF WBC: CPT

## 2023-07-08 PROCEDURE — 96374 THER/PROPH/DIAG INJ IV PUSH: CPT

## 2023-07-08 PROCEDURE — 36415 COLL VENOUS BLD VENIPUNCTURE: CPT

## 2023-07-08 PROCEDURE — 80053 COMPREHEN METABOLIC PANEL: CPT

## 2023-07-08 PROCEDURE — 99285 EMERGENCY DEPT VISIT HI MDM: CPT

## 2023-07-08 PROCEDURE — 84484 ASSAY OF TROPONIN QUANT: CPT

## 2023-07-08 PROCEDURE — 82272 OCCULT BLD FECES 1-3 TESTS: CPT

## 2023-07-08 RX ORDER — SODIUM CHLORIDE 9 MG/ML
INJECTION, SOLUTION INTRAVENOUS CONTINUOUS
Status: DISCONTINUED | OUTPATIENT
Start: 2023-07-08 | End: 2023-07-10 | Stop reason: SDUPTHER

## 2023-07-08 RX ORDER — ONDANSETRON 2 MG/ML
4 INJECTION INTRAMUSCULAR; INTRAVENOUS EVERY 6 HOURS PRN
Status: DISCONTINUED | OUTPATIENT
Start: 2023-07-08 | End: 2023-07-10 | Stop reason: HOSPADM

## 2023-07-08 RX ORDER — PREDNISONE 5 MG/1
5 TABLET ORAL 2 TIMES DAILY
Status: DISCONTINUED | OUTPATIENT
Start: 2023-07-08 | End: 2023-07-10 | Stop reason: HOSPADM

## 2023-07-08 RX ORDER — FOLIC ACID 1 MG/1
1 TABLET ORAL DAILY
Status: DISCONTINUED | OUTPATIENT
Start: 2023-07-08 | End: 2023-07-10 | Stop reason: HOSPADM

## 2023-07-08 RX ORDER — M-VIT,TX,IRON,MINS/CALC/FOLIC 27MG-0.4MG
1 TABLET ORAL DAILY
COMMUNITY

## 2023-07-08 RX ORDER — ACETAMINOPHEN 325 MG/1
650 TABLET ORAL EVERY 4 HOURS PRN
Status: DISCONTINUED | OUTPATIENT
Start: 2023-07-08 | End: 2023-07-10 | Stop reason: HOSPADM

## 2023-07-08 RX ORDER — POLYETHYLENE GLYCOL 3350 17 G/17G
17 POWDER, FOR SOLUTION ORAL 2 TIMES DAILY
Status: DISCONTINUED | OUTPATIENT
Start: 2023-07-08 | End: 2023-07-10 | Stop reason: HOSPADM

## 2023-07-08 RX ORDER — GABAPENTIN 300 MG/1
300 CAPSULE ORAL NIGHTLY
Status: DISCONTINUED | OUTPATIENT
Start: 2023-07-08 | End: 2023-07-10 | Stop reason: HOSPADM

## 2023-07-08 RX ADMIN — POLYETHYLENE GLYCOL 3350 17 G: 17 POWDER, FOR SOLUTION ORAL at 09:56

## 2023-07-08 RX ADMIN — SODIUM CHLORIDE: 9 INJECTION, SOLUTION INTRAVENOUS at 21:24

## 2023-07-08 RX ADMIN — SODIUM CHLORIDE 40 MG: 9 INJECTION INTRAMUSCULAR; INTRAVENOUS; SUBCUTANEOUS at 14:02

## 2023-07-08 RX ADMIN — GABAPENTIN 300 MG: 300 CAPSULE ORAL at 21:17

## 2023-07-08 RX ADMIN — FOLIC ACID 1 MG: 1 TABLET ORAL at 09:56

## 2023-07-08 RX ADMIN — SODIUM CHLORIDE 80 MG: 9 INJECTION INTRAMUSCULAR; INTRAVENOUS; SUBCUTANEOUS at 02:45

## 2023-07-08 RX ADMIN — SODIUM CHLORIDE: 9 INJECTION, SOLUTION INTRAVENOUS at 09:56

## 2023-07-08 ASSESSMENT — PAIN - FUNCTIONAL ASSESSMENT: PAIN_FUNCTIONAL_ASSESSMENT: 0-10

## 2023-07-08 ASSESSMENT — PAIN SCALES - GENERAL: PAINLEVEL_OUTOF10: 0

## 2023-07-09 PROBLEM — K92.2 GI BLEED: Status: ACTIVE | Noted: 2023-07-09

## 2023-07-09 LAB
ALBUMIN SERPL-MCNC: 3 G/DL (ref 3.5–5)
ALBUMIN/GLOB SERPL: 0.9 (ref 1.1–2.2)
ALP SERPL-CCNC: 84 U/L (ref 45–117)
ALT SERPL-CCNC: 45 U/L (ref 12–78)
ANION GAP SERPL CALC-SCNC: 3 MMOL/L (ref 5–15)
AST SERPL-CCNC: 30 U/L (ref 15–37)
BILIRUB SERPL-MCNC: 0.8 MG/DL (ref 0.2–1)
BUN SERPL-MCNC: 15 MG/DL (ref 6–20)
BUN/CREAT SERPL: 13 (ref 12–20)
CALCIUM SERPL-MCNC: 8.8 MG/DL (ref 8.5–10.1)
CHLORIDE SERPL-SCNC: 112 MMOL/L (ref 97–108)
CO2 SERPL-SCNC: 27 MMOL/L (ref 21–32)
CREAT SERPL-MCNC: 1.2 MG/DL (ref 0.7–1.3)
ERYTHROCYTE [DISTWIDTH] IN BLOOD BY AUTOMATED COUNT: 15.3 % (ref 11.5–14.5)
GLOBULIN SER CALC-MCNC: 3.2 G/DL (ref 2–4)
GLUCOSE SERPL-MCNC: 100 MG/DL (ref 65–100)
HCT VFR BLD AUTO: 47.7 % (ref 36.6–50.3)
HGB BLD-MCNC: 15.2 G/DL (ref 12.1–17)
MCH RBC QN AUTO: 28.7 PG (ref 26–34)
MCHC RBC AUTO-ENTMCNC: 31.9 G/DL (ref 30–36.5)
MCV RBC AUTO: 90.2 FL (ref 80–99)
NRBC # BLD: 0 K/UL (ref 0–0.01)
NRBC BLD-RTO: 0 PER 100 WBC
PLATELET # BLD AUTO: 166 K/UL (ref 150–400)
PMV BLD AUTO: 9.7 FL (ref 8.9–12.9)
POTASSIUM SERPL-SCNC: 4 MMOL/L (ref 3.5–5.1)
PROT SERPL-MCNC: 6.2 G/DL (ref 6.4–8.2)
RBC # BLD AUTO: 5.29 M/UL (ref 4.1–5.7)
SODIUM SERPL-SCNC: 142 MMOL/L (ref 136–145)
WBC # BLD AUTO: 6.7 K/UL (ref 4.1–11.1)

## 2023-07-09 PROCEDURE — C9113 INJ PANTOPRAZOLE SODIUM, VIA: HCPCS | Performed by: STUDENT IN AN ORGANIZED HEALTH CARE EDUCATION/TRAINING PROGRAM

## 2023-07-09 PROCEDURE — G0378 HOSPITAL OBSERVATION PER HR: HCPCS

## 2023-07-09 PROCEDURE — 36415 COLL VENOUS BLD VENIPUNCTURE: CPT

## 2023-07-09 PROCEDURE — 6360000002 HC RX W HCPCS: Performed by: STUDENT IN AN ORGANIZED HEALTH CARE EDUCATION/TRAINING PROGRAM

## 2023-07-09 PROCEDURE — 2580000003 HC RX 258: Performed by: HOSPITALIST

## 2023-07-09 PROCEDURE — 2580000003 HC RX 258: Performed by: STUDENT IN AN ORGANIZED HEALTH CARE EDUCATION/TRAINING PROGRAM

## 2023-07-09 PROCEDURE — 94761 N-INVAS EAR/PLS OXIMETRY MLT: CPT

## 2023-07-09 PROCEDURE — 96376 TX/PRO/DX INJ SAME DRUG ADON: CPT

## 2023-07-09 PROCEDURE — 80053 COMPREHEN METABOLIC PANEL: CPT

## 2023-07-09 PROCEDURE — A4216 STERILE WATER/SALINE, 10 ML: HCPCS | Performed by: STUDENT IN AN ORGANIZED HEALTH CARE EDUCATION/TRAINING PROGRAM

## 2023-07-09 PROCEDURE — 6370000000 HC RX 637 (ALT 250 FOR IP): Performed by: STUDENT IN AN ORGANIZED HEALTH CARE EDUCATION/TRAINING PROGRAM

## 2023-07-09 PROCEDURE — 85027 COMPLETE CBC AUTOMATED: CPT

## 2023-07-09 RX ORDER — TRIAMCINOLONE ACETONIDE 1 MG/G
60 CREAM TOPICAL ONCE
COMMUNITY

## 2023-07-09 RX ADMIN — POLYETHYLENE GLYCOL 3350 17 G: 17 POWDER, FOR SOLUTION ORAL at 21:28

## 2023-07-09 RX ADMIN — SODIUM CHLORIDE 40 MG: 9 INJECTION INTRAMUSCULAR; INTRAVENOUS; SUBCUTANEOUS at 01:06

## 2023-07-09 RX ADMIN — SODIUM CHLORIDE: 9 INJECTION, SOLUTION INTRAVENOUS at 07:53

## 2023-07-09 RX ADMIN — GABAPENTIN 300 MG: 300 CAPSULE ORAL at 21:29

## 2023-07-09 RX ADMIN — SODIUM CHLORIDE 40 MG: 9 INJECTION INTRAMUSCULAR; INTRAVENOUS; SUBCUTANEOUS at 14:00

## 2023-07-09 RX ADMIN — POLYETHYLENE GLYCOL 3350 17 G: 17 POWDER, FOR SOLUTION ORAL at 10:30

## 2023-07-09 RX ADMIN — FOLIC ACID 1 MG: 1 TABLET ORAL at 10:24

## 2023-07-09 ASSESSMENT — PAIN SCALES - GENERAL
PAINLEVEL_OUTOF10: 0
PAINLEVEL_OUTOF10: 0

## 2023-07-10 ENCOUNTER — ANESTHESIA (OUTPATIENT)
Facility: HOSPITAL | Age: 63
End: 2023-07-10
Payer: COMMERCIAL

## 2023-07-10 ENCOUNTER — ANESTHESIA EVENT (OUTPATIENT)
Facility: HOSPITAL | Age: 63
End: 2023-07-10
Payer: COMMERCIAL

## 2023-07-10 VITALS
DIASTOLIC BLOOD PRESSURE: 82 MMHG | OXYGEN SATURATION: 93 % | TEMPERATURE: 97.8 F | SYSTOLIC BLOOD PRESSURE: 108 MMHG | BODY MASS INDEX: 22.37 KG/M2 | WEIGHT: 151.01 LBS | HEIGHT: 69 IN | RESPIRATION RATE: 39 BRPM | HEART RATE: 88 BPM

## 2023-07-10 LAB
ANION GAP SERPL CALC-SCNC: 6 MMOL/L (ref 5–15)
BASOPHILS # BLD: 0.1 K/UL (ref 0–0.1)
BASOPHILS NFR BLD: 1 % (ref 0–1)
BUN SERPL-MCNC: 12 MG/DL (ref 6–20)
BUN/CREAT SERPL: 11 (ref 12–20)
CALCIUM SERPL-MCNC: 9.2 MG/DL (ref 8.5–10.1)
CHLORIDE SERPL-SCNC: 112 MMOL/L (ref 97–108)
CO2 SERPL-SCNC: 25 MMOL/L (ref 21–32)
CREAT SERPL-MCNC: 1.14 MG/DL (ref 0.7–1.3)
DIFFERENTIAL METHOD BLD: ABNORMAL
EKG ATRIAL RATE: 94 BPM
EKG DIAGNOSIS: NORMAL
EKG P AXIS: 61 DEGREES
EKG P-R INTERVAL: 150 MS
EKG Q-T INTERVAL: 360 MS
EKG QRS DURATION: 78 MS
EKG QTC CALCULATION (BAZETT): 450 MS
EKG R AXIS: -14 DEGREES
EKG T AXIS: 34 DEGREES
EKG VENTRICULAR RATE: 94 BPM
EOSINOPHIL # BLD: 0.9 K/UL (ref 0–0.4)
EOSINOPHIL NFR BLD: 15 % (ref 0–7)
ERYTHROCYTE [DISTWIDTH] IN BLOOD BY AUTOMATED COUNT: 15.3 % (ref 11.5–14.5)
EST. AVERAGE GLUCOSE BLD GHB EST-MCNC: 97 MG/DL
FERRITIN SERPL-MCNC: 82 NG/ML (ref 26–388)
GLUCOSE SERPL-MCNC: 82 MG/DL (ref 65–100)
HBA1C MFR BLD: 5 % (ref 4–5.6)
HCT VFR BLD AUTO: 48.6 % (ref 36.6–50.3)
HGB BLD-MCNC: 15.5 G/DL (ref 12.1–17)
IMM GRANULOCYTES # BLD AUTO: 0 K/UL (ref 0–0.04)
IMM GRANULOCYTES NFR BLD AUTO: 0 % (ref 0–0.5)
IRON SATN MFR SERPL: 39 % (ref 20–50)
IRON SERPL-MCNC: 105 UG/DL (ref 35–150)
LYMPHOCYTES # BLD: 1 K/UL (ref 0.8–3.5)
LYMPHOCYTES NFR BLD: 17 % (ref 12–49)
MAGNESIUM SERPL-MCNC: 1.9 MG/DL (ref 1.6–2.4)
MCH RBC QN AUTO: 28.9 PG (ref 26–34)
MCHC RBC AUTO-ENTMCNC: 31.9 G/DL (ref 30–36.5)
MCV RBC AUTO: 90.7 FL (ref 80–99)
MONOCYTES # BLD: 0.8 K/UL (ref 0–1)
MONOCYTES NFR BLD: 13 % (ref 5–13)
NEUTS SEG # BLD: 3.2 K/UL (ref 1.8–8)
NEUTS SEG NFR BLD: 54 % (ref 32–75)
NRBC # BLD: 0 K/UL (ref 0–0.01)
NRBC BLD-RTO: 0 PER 100 WBC
PHOSPHATE SERPL-MCNC: 3.2 MG/DL (ref 2.6–4.7)
PLATELET # BLD AUTO: 173 K/UL (ref 150–400)
PMV BLD AUTO: 10.3 FL (ref 8.9–12.9)
POTASSIUM SERPL-SCNC: 4.1 MMOL/L (ref 3.5–5.1)
RBC # BLD AUTO: 5.36 M/UL (ref 4.1–5.7)
RBC MORPH BLD: ABNORMAL
SODIUM SERPL-SCNC: 143 MMOL/L (ref 136–145)
TIBC SERPL-MCNC: 270 UG/DL (ref 250–450)
WBC # BLD AUTO: 6 K/UL (ref 4.1–11.1)

## 2023-07-10 PROCEDURE — 84100 ASSAY OF PHOSPHORUS: CPT

## 2023-07-10 PROCEDURE — 3600007512: Performed by: INTERNAL MEDICINE

## 2023-07-10 PROCEDURE — 96376 TX/PRO/DX INJ SAME DRUG ADON: CPT

## 2023-07-10 PROCEDURE — 7100000011 HC PHASE II RECOVERY - ADDTL 15 MIN: Performed by: INTERNAL MEDICINE

## 2023-07-10 PROCEDURE — 6370000000 HC RX 637 (ALT 250 FOR IP): Performed by: STUDENT IN AN ORGANIZED HEALTH CARE EDUCATION/TRAINING PROGRAM

## 2023-07-10 PROCEDURE — 98960 EDU&TRN PT SELF-MGMT NQHP 1: CPT

## 2023-07-10 PROCEDURE — 85025 COMPLETE CBC W/AUTO DIFF WBC: CPT

## 2023-07-10 PROCEDURE — 3600007502: Performed by: INTERNAL MEDICINE

## 2023-07-10 PROCEDURE — 2709999900 HC NON-CHARGEABLE SUPPLY: Performed by: INTERNAL MEDICINE

## 2023-07-10 PROCEDURE — 83735 ASSAY OF MAGNESIUM: CPT

## 2023-07-10 PROCEDURE — 83540 ASSAY OF IRON: CPT

## 2023-07-10 PROCEDURE — C9113 INJ PANTOPRAZOLE SODIUM, VIA: HCPCS | Performed by: STUDENT IN AN ORGANIZED HEALTH CARE EDUCATION/TRAINING PROGRAM

## 2023-07-10 PROCEDURE — 36415 COLL VENOUS BLD VENIPUNCTURE: CPT

## 2023-07-10 PROCEDURE — 3700000001 HC ADD 15 MINUTES (ANESTHESIA): Performed by: INTERNAL MEDICINE

## 2023-07-10 PROCEDURE — 94761 N-INVAS EAR/PLS OXIMETRY MLT: CPT

## 2023-07-10 PROCEDURE — 2580000003 HC RX 258: Performed by: INTERNAL MEDICINE

## 2023-07-10 PROCEDURE — A4216 STERILE WATER/SALINE, 10 ML: HCPCS | Performed by: STUDENT IN AN ORGANIZED HEALTH CARE EDUCATION/TRAINING PROGRAM

## 2023-07-10 PROCEDURE — 6360000002 HC RX W HCPCS: Performed by: NURSE ANESTHETIST, CERTIFIED REGISTERED

## 2023-07-10 PROCEDURE — 82728 ASSAY OF FERRITIN: CPT

## 2023-07-10 PROCEDURE — 6360000002 HC RX W HCPCS: Performed by: STUDENT IN AN ORGANIZED HEALTH CARE EDUCATION/TRAINING PROGRAM

## 2023-07-10 PROCEDURE — G0378 HOSPITAL OBSERVATION PER HR: HCPCS

## 2023-07-10 PROCEDURE — 80048 BASIC METABOLIC PNL TOTAL CA: CPT

## 2023-07-10 PROCEDURE — 7100000010 HC PHASE II RECOVERY - FIRST 15 MIN: Performed by: INTERNAL MEDICINE

## 2023-07-10 PROCEDURE — 2580000003 HC RX 258: Performed by: STUDENT IN AN ORGANIZED HEALTH CARE EDUCATION/TRAINING PROGRAM

## 2023-07-10 PROCEDURE — 83550 IRON BINDING TEST: CPT

## 2023-07-10 PROCEDURE — 83036 HEMOGLOBIN GLYCOSYLATED A1C: CPT

## 2023-07-10 PROCEDURE — 3700000000 HC ANESTHESIA ATTENDED CARE: Performed by: INTERNAL MEDICINE

## 2023-07-10 RX ORDER — SODIUM CHLORIDE 9 MG/ML
INJECTION, SOLUTION INTRAVENOUS CONTINUOUS
Status: DISCONTINUED | OUTPATIENT
Start: 2023-07-10 | End: 2023-07-10 | Stop reason: HOSPADM

## 2023-07-10 RX ORDER — AZITHROMYCIN 250 MG/1
500 TABLET, FILM COATED ORAL DAILY
Status: DISCONTINUED | OUTPATIENT
Start: 2023-07-10 | End: 2023-07-10 | Stop reason: HOSPADM

## 2023-07-10 RX ORDER — PANTOPRAZOLE SODIUM 40 MG/1
40 TABLET, DELAYED RELEASE ORAL
Status: DISCONTINUED | OUTPATIENT
Start: 2023-07-11 | End: 2023-07-10 | Stop reason: HOSPADM

## 2023-07-10 RX ORDER — PROPOFOL 10 MG/ML
INJECTION, EMULSION INTRAVENOUS PRN
Status: DISCONTINUED | OUTPATIENT
Start: 2023-07-10 | End: 2023-07-10 | Stop reason: SDUPTHER

## 2023-07-10 RX ADMIN — PROPOFOL 50 MG: 10 INJECTION, EMULSION INTRAVENOUS at 12:18

## 2023-07-10 RX ADMIN — SODIUM CHLORIDE: 9 INJECTION, SOLUTION INTRAVENOUS at 04:48

## 2023-07-10 RX ADMIN — FOLIC ACID 1 MG: 1 TABLET ORAL at 09:48

## 2023-07-10 RX ADMIN — SODIUM CHLORIDE: 9 INJECTION, SOLUTION INTRAVENOUS at 12:15

## 2023-07-10 RX ADMIN — SODIUM CHLORIDE 40 MG: 9 INJECTION INTRAMUSCULAR; INTRAVENOUS; SUBCUTANEOUS at 02:28

## 2023-07-10 RX ADMIN — PROPOFOL 50 MG: 10 INJECTION, EMULSION INTRAVENOUS at 12:23

## 2023-07-10 NOTE — PERIOP NOTE
TRANSFER - OUT REPORT:    Verbal report given to Lg on Damian Alcala  being transferred to 5th floor [phone] for routine progression of patient care       Report consisted of patient's Situation, Background, Assessment and   Recommendations(SBAR). Information from the following report(s) Nurse Handoff Report was reviewed with the receiving nurse. Lines:   Peripheral IV 07/08/23 Right Antecubital (Active)   Site Assessment Clean, dry & intact 07/10/23 1245   Line Status Capped;Normal saline locked; Flushed 07/10/23 1245   Line Care Cap changed; Connections checked and tightened;Ports disinfected 07/10/23 1245   Phlebitis Assessment No symptoms 07/10/23 1245   Infiltration Assessment 0 07/10/23 1245   Alcohol Cap Used Yes 07/10/23 1245   Dressing Status Clean, dry & intact 07/10/23 1245   Dressing Type Transparent 07/10/23 1245   Dressing Intervention New 07/08/23 0132       Peripheral IV 07/10/23 Left; Anterior Forearm (Active)   Site Assessment Clean, dry & intact 07/10/23 0825   Line Status Brisk blood return 07/10/23 0825   Line Care Ports disinfected 07/10/23 0825   Phlebitis Assessment No symptoms 07/10/23 0825   Infiltration Assessment 0 07/10/23 0825   Alcohol Cap Used Yes 07/10/23 0825   Dressing Status Clean, dry & intact 07/10/23 0825   Dressing Type Transparent 07/10/23 0825        Opportunity for questions and clarification was provided.       Patient transported with:  [email or web address]

## 2023-07-10 NOTE — PROGRESS NOTES
[facility]  1960  [id number]    Situation:  Verbal report received from: Nesha Garcia RN  Procedure: Procedure(s):  EGD ESOPHAGOGASTRODUODENOSCOPY    Background:    Preoperative diagnosis: Hematemesis, unspecified whether nausea present [K92.0]  Postoperative diagnosis: * No post-op diagnosis entered *    :  Dr. Zackary Miller  Assistant(s): Circulator: Nuria Woodward RN  Endoscopy Technician: Alida East    Specimens: * No specimens in log *  H. Pylori  No    Assessment:  Intra-procedure medications   Anesthesia gave intra-procedure sedation and medications, see anesthesia flow sheet Yes    Intravenous fluids: NS@ [place]      Vital signs stable yes    Abdominal assessment: round and soft yes    Recommendation:  Return to floor yes  Family or Friend wife in waiting room  Permission to share finding with family or friend Yes

## 2023-07-10 NOTE — OP NOTE
Ady Garcia M.D.  [phone]           7/10/2023                EGD Operative Report  Jacqueline Rios  :  1960  [place]ro Pang Medical Record Number:  [id number]      Indication: Hematemesis    : Dameon Herzog MD    Referring Provider:  Lionel Merritt MD      Anesthesia/Sedation:  MAC anesthesia    Airway assessment: No airway problems anticipated    Pre-Procedural Exam:      Airway: clear, no airway problems anticipated  Heart: RRR, without gallops or rubs  Lungs: clear bilaterally without wheezes, crackles, or rhonchi  Abdomen: soft, nontender, nondistended, bowel sounds present  Mental Status: awake, alert and oriented to person, place and time       Procedure Details     After infomed consent was obtained for the procedure, with all risks and benefits of procedure explained the patient was taken to the endoscopy suite and placed in the left lateral decubitus position. Following sequential administration of sedation as per above, the endoscope was inserted into the mouth and advanced under direct vision to second portion of the duodenum. A careful inspection was made as the gastroscope was withdrawn, including a retroflexed view of the proximal stomach; findings and interventions are described below. Findings:   Esophagus:Mucosa within normal through the esophagus, mildly irregular Z-line with erythema noted, no ulcers or lesions seen. Stomach: Small hiatal hernia, about 3 cm in length, otherwise gastric mucosa within normal.  Duodenum/jejunum: normal    Therapies:  none    Specimens: none           Complications:   None; patient tolerated the procedure well. EBL:  None.            Impression:    Small hiatal hernia and minimal erythema at the GE-junction     Recommendations:    -Continue acid suppression.  -Continue with anti-reflux measures  -Can discharge home from GI standpoint    Dameon Herzog MD

## 2023-07-10 NOTE — DISCHARGE INSTRUCTIONS
HPI  Ana Paula Saeed is a 61 y.o. female who presents today for HTN and DM. Hypertension ROS: taking medications as instructed, no medication side effects noted, no TIA's, no chest pain on exertion, no dyspnea on exertion, no swelling of ankles. Diabetic Review of Systems - medication compliance: compliant all of the time, diabetic diet compliance: noncompliant some of the time, home glucose monitoring: is performed regularly. Pt states she ran out of diabetes medication two days ago. Pt is requesting medication refills today and that they be sent to [address] [place] [address]. Pt denies any acute medical concerns today. Current Outpatient Medications   Medication Sig Dispense Refill    atorvastatin (LIPITOR) 40 mg tablet TAKE 1 TABLET BY MOUTH NIGHTLY. 90 Tab 0    linagliptin (TRADJENTA) 5 mg tablet TAKE ONE TABLET BY MOUTH ONCE DAILY 90 Tab 0    furosemide (LASIX) 40 mg tablet Take 1 Tab by mouth daily. 90 Tab 0    traZODone (DESYREL) 50 mg tablet TAKE 1 TABLET EVERY NIGHT 90 Tab 0    levothyroxine (SYNTHROID) 50 mcg tablet Take 1 Tab by mouth every morning. 90 Tab 0    gabapentin (NEURONTIN) 300 mg capsule Take 1 Cap by mouth three (3) times daily for 90 days. 270 Cap 0    ipratropium (ATROVENT HFA) 17 mcg/actuation inhaler Take 1 Puff by inhalation every six (6) hours as needed for Wheezing. 1 Inhaler 2    glipiZIDE (GLUCOTROL) 10 mg tablet Take 1 tablet by mouth twice daily.    60 Tab 2    budesonide-formoterol (SYMBICORT) 160-4.5 mcg/actuation HFAA INHALE 2 PUFFS BY MOUTH TWICE DAILY, RINSE MOUTH AFTER USE 1 Inhaler 0    clopidogrel (PLAVIX) 75 mg tab TAKE 1 TABLET EVERY DAY 30 Tab 0    folic acid (FOLVITE) 1 mg tablet TAKE ONE TABLET BY MOUTH EVERY DAY 30 Tab 0    ergocalciferol (ERGOCALCIFEROL) 50,000 unit capsule Take 1 Cap by mouth every seven (7) days.  6 Cap 0    glucose blood VI test strips (ACCU-CHEK SMARTVIEW TEST STRIP) strip CHECK BLOOD SUGAR 3 TIMES DAILY 100 Strip 1    amLODIPine (NORVASC) 5 mg tablet Take 1 Tab by mouth daily. 30 Tab 2    calcium acetate (PHOSLO) 667 mg cap Take 1 Cap by mouth three (3) times daily.  trimethoprim-sulfamethoxazole (BACTRIM DS, SEPTRA DS) 160-800 mg per tablet Take 1 Tab by mouth two (2) times a day.  acetaminophen (TYLENOL) 500 mg tablet Take 1,000 mg by mouth every six (6) hours as needed for Pain.  OXYGEN-AIR DELIVERY SYSTEMS 2 L by IntraNASal route continuous.  carvedilol (COREG) 6.25 mg tablet Take 1 Tab by mouth two (2) times daily (with meals). 60 Tab 0    tiotropium (SPIRIVA) 18 mcg inhalation capsule Take 1 Cap by inhalation daily. 30 Cap 0    sucroferric oxyhydroxide (VELPHORO) 500 mg chew chewable tablet Take 500 mg by mouth three (3) times daily (with meals).  Insulin Needles, Disposable, (BD ULTRA-FINE SHORT PEN NEEDLE) 31 gauge x 5/16\" ndle Use one device daily. 100 Pen Needle 3    insulin syringe-needle U-100 (BD INSULIN SYRINGE ULTRA-FINE) 1 mL 31 gauge x 15/64\" syrg Sig: Check blood glucose twice daily 100 Pen Needle 3    ACCU-CHEK AFTAB misc CHECK BLOOD SUGAR 3 TIMES DAILY 1 Each 3    nitroglycerin (NITROSTAT) 0.4 mg SL tablet 1 Tab by SubLINGual route as needed for Chest Pain. (Patient taking differently: 1 Tab by SubLINGual route as needed for Chest Pain. 1 tab every 5 minutes for chest pain up to 3 doses, then call 911) 1 Bottle 1    LINZESS 145 mcg cap capsule TAKE 1 CAP BY MOUTH DAILY (BEFORE BREAKFAST).  90 Cap 3    NEXIUM 20 mg capsule       alcohol swabs (BD SINGLE USE SWABS REGULAR) padm Sig: Use four times daily  Dispense 1 pack 200 Each with 4 refills 1 Each 4    Insulin Syringe-Needle U-100 1 mL 31 x 5/16\" syrg       Nebulizer & Compressor machine Use every 4-6 hours, as needed 1 each 0    insulin glargine (BASAGLAR KWIKPEN U-100 INSULIN) 100 unit/mL (3 mL) inpn INJECT 40 UNITS SUBCUTANEOUSLY ONCE DAILY 3 Adjustable Dose Pre-filled Pen Syringe 3    clopidogrel (PLAVIX) 75 mg tab Take 1 Tab by Date/Time                                                                                                                                              Patient or Representative Signature                                                          Date/Time          Gastroesophageal Reflux Disease (GERD): Care Instructions  Overview     Gastroesophageal reflux disease (GERD) is the backward flow of stomach acid into the esophagus. The esophagus is the tube that leads from your throat to your stomach. A one-way valve prevents the stomach acid from backing up into this tube. But when you have GERD, this valve does not close tightly enough. This can also cause pain and swelling in your esophagus. (This is called esophagitis.)  If you have mild GERD symptoms including heartburn, you may be able to control the problem with antacids or over-the-counter medicine. You can also make lifestyle changes to help reduce your symptoms. These include changing your diet and eating habits, such as not eating late at night and losing weight. Follow-up care is a key part of your treatment and safety. Be sure to make and go to all appointments, and call your doctor if you are having problems. It's also a good idea to know your test results and keep a list of the medicines you take. How can you care for yourself at home? Take your medicines exactly as prescribed. Call your doctor if you think you are having a problem with your medicine. Your doctor may recommend over-the-counter medicine. For mild or occasional indigestion, antacids, such as Tums, Gaviscon, Mylanta, or Maalox, may help. Your doctor also may recommend over-the-counter acid reducers, such as famotidine (Pepcid AC), cimetidine (Tagamet HB), or omeprazole (Prilosec). Read and follow all instructions on the label. If you use these medicines often, talk with your doctor. Change your eating habits.   It's best to eat mouth daily. 30 Tab 0    folic acid (FOLVITE) 1 mg tablet TAKE ONE TABLET BY MOUTH EVERY DAY 30 Tab 0    guaiFENesin ER (MUCINEX) 600 mg ER tablet Take 1 Tab by mouth two (2) times a day. 30 Tab 0      Allergies   Allergen Reactions    Baclofen Other (comments)     Contra-indicated for a dialysis patient       SUBJECTIVE:  Review of Systems   Constitutional: Negative for chills, fever and malaise/fatigue. Eyes: Negative for blurred vision. Respiratory: Negative for shortness of breath. Cardiovascular: Negative for chest pain, palpitations and leg swelling. Gastrointestinal: Negative for abdominal pain, diarrhea, nausea and vomiting. Genitourinary: Negative for dysuria, frequency and urgency. Musculoskeletal: Negative for falls and myalgias. Neurological: Negative for dizziness, tingling, sensory change and headaches. OBJECTIVE:  Visit Vitals  /88 (BP 1 Location: Left arm, BP Patient Position: Sitting)   Pulse 84   Temp 98.2 °F (36.8 °C) (Oral)   Resp 18   Ht 5' (1.524 m)   Wt 237 lb 6.4 oz (107.7 kg)   SpO2 94%   BMI 46.36 kg/m²      I have reviewed/discussed the above normal BMI with the patient. I have recommended the following interventions: dietary management education, guidance, and counseling, encourage exercise and monitor weight . Physical Exam   Constitutional: She is oriented to person, place, and time and well-developed, well-nourished, and in no distress. HENT:   Head: Normocephalic. Eyes: Pupils are equal, round, and reactive to light. Conjunctivae and EOM are normal.   Neck: Normal range of motion. Neck supple. No thyromegaly present. Cardiovascular: Normal rate, regular rhythm and normal heart sounds. Pulmonary/Chest: Effort normal and breath sounds normal. She has no wheezes. She has no rales. She exhibits no tenderness. Musculoskeletal: She exhibits no edema. Neurological: She is alert and oriented to person, place, and time.  Gait normal.   Skin: Skin is warm and dry. No erythema. Psychiatric: Mood and affect normal.   Nursing note and vitals reviewed. ASSESSMENT:  Diagnoses and all orders for this visit:    1. Diabetic polyneuropathy associated with type 2 diabetes mellitus (HCC)  -     gabapentin (NEURONTIN) 300 mg capsule; Take 1 Cap by mouth three (3) times daily for 90 days. 2. Diabetes mellitus type 2, insulin dependent ([facility])  -     linagliptin (TRADJENTA) 5 mg tablet; TAKE ONE TABLET BY MOUTH ONCE DAILY  -     AMB POC HEMOGLOBIN A1C    3. Chronic obstructive pulmonary disease, unspecified COPD type ([facility])  -     ipratropium (ATROVENT HFA) 17 mcg/actuation inhaler; Take 1 Puff by inhalation every six (6) hours as needed for Wheezing. Other orders  -     atorvastatin (LIPITOR) 40 mg tablet; TAKE 1 TABLET BY MOUTH NIGHTLY. -     furosemide (LASIX) 40 mg tablet; Take 1 Tab by mouth daily. -     traZODone (DESYREL) 50 mg tablet; TAKE 1 TABLET EVERY NIGHT  -     levothyroxine (SYNTHROID) 50 mcg tablet; Take 1 Tab by mouth every morning.  -     glipiZIDE (GLUCOTROL) 10 mg tablet; Take 1 tablet by mouth twice daily.       PLAN:  Medication refills today  Hemoglobin A1C today  Diabetic diet discussed in detail, written exchange diet given   Glycohemoglobin and other lab monitoring discussed    I have discussed the diagnosis with the patient and the intended plan as seen in the above orders. The patient has received an after-visit summary and questions were answered concerning future plans. I have discussed medication side effects and warnings with the patient as well. Patient will call for further questions. Follow-up and Dispositions    · Return in about 3 months (around 8/30/2019) for DM, COPD.        Claudia Jimenez NP

## 2023-07-10 NOTE — DISCHARGE SUMMARY
[address] [place] [address]  [phone]          Hospitalist Discharge Summary     Patient ID:  Bsuhra Lobo  [id number]  63 y.o.  1960    Admit date: 7/8/2023    Discharge date and time: 7/10/2023 3:03 PM    Admission Diagnoses: GIB (gastrointestinal bleeding) [K92.2]  Hematemesis, unspecified whether nausea present [K92.0]  GI bleed [K92.2]    Discharge Diagnoses:  Principal Diagnosis GIB (gastrointestinal bleeding)                                            Principal Problem:    GIB (gastrointestinal bleeding)  Active Problems:    GI bleed  Resolved Problems:    * No resolved hospital problems. *         Hospital Course:     64 yo hx of systemic sclerosis, chronic cough, presented w/ hematemesis    1) Hematemesis: now resolved. Hgb stable. Likely due to esophageal irritation. GI performed an EGD which showed a small hiatal hernia.   Will cont PPI    2) Systemic sclerosis: cont Actemra, prednisone, azithromycin prophylaxis     3) Chronic cough: cont cough suppressants     PCP: Sridhar Barlow MD     Consults: GI    Significant Diagnostic Studies: EGD    Discharge Exam:  Physical Exam:    Gen: Well-developed, well-nourished, in no acute distress  HEENT:  Pink conjunctivae, PERRL, hearing intact to voice, moist mucous membranes  Neck: Supple, without masses, thyroid non-tender  Resp: No accessory muscle use, clear breath sounds without wheezes rales or rhonchi  Card: No murmurs, normal S1, S2 without thrills, bruits or peripheral edema  Abd:  Soft, non-tender, non-distended, normoactive bowel sounds are present  Lymph:  No cervical or inguinal adenopathy  Musc: No cyanosis or clubbing  Skin: No rashes   Neuro:  Cranial nerves are grossly intact, no focal motor weakness, follows commands appropriately  Psych:  Good insight, oriented to person, place and time, alert    Disposition: home  Discharge Condition: Stable    Patient Instructions:

## 2023-07-10 NOTE — PLAN OF CARE
Problem: Pain  Goal: Verbalizes/displays adequate comfort level or baseline comfort level  7/9/2023 2045 by Epifanio Carmona RN  Outcome: Progressing  7/9/2023 1949 by Stephanie Ruby RN  Outcome: Progressing     Problem: Safety - Adult  Goal: Free from fall injury  7/9/2023 2045 by Epifanio Carmona RN  Outcome: Progressing  7/9/2023 1949 by Stephanie Ruby RN  Outcome: Progressing

## 2023-07-10 NOTE — ANESTHESIA PRE PROCEDURE
Department of Anesthesiology  Preprocedure Note       Name:  Ziggy Garcia   Age:  63 y.o.  :  1960                                          MRN:  [id number]         Date:  7/10/2023      Surgeon: Daniel Mtz):  Afua Bello MD    Procedure: Procedure(s):  EGD ESOPHAGOGASTRODUODENOSCOPY    Medications prior to admission:   Prior to Admission medications    Medication Sig Start Date End Date Taking? Authorizing Provider   triamcinolone (KENALOG) 0.1 % cream Apply 60 g topically once Apply topically  daily.    Yes Historical Provider, MD   Multiple Vitamins-Minerals (THERAPEUTIC MULTIVITAMIN-MINERALS) tablet Take 1 tablet by mouth daily   Yes Historical Provider, MD   azithromycin (ZITHROMAX) 500 MG tablet  23   Historical Provider, MD   Zinc Sulfate 66 MG TABS Take by mouth    Historical Provider, MD   predniSONE (DELTASONE) 5 MG tablet Take 1 tablet by mouth 2 times daily 23   Marco Mujica MD   ascorbic acid (VITAMIN C) 500 MG tablet Take 1 tablet by mouth    Ar Automatic Reconciliation   vitamin D (CHOLECALCIFEROL) 25 MCG (1000 UT) TABS tablet Take 1 tablet by mouth daily    Ar Automatic Reconciliation   folic acid (FOLVITE) 1 MG tablet Take by mouth daily    Ar Automatic Reconciliation   gabapentin (NEURONTIN) 300 MG capsule TAKE 1 CAPSULE BY MOUTH EVERY NIGHT AT BEDTIME AND INCREASE 1 CAPSULE EVERY 5 DAYS UNTIL COUGH IS CONTROLLED OR MAX DOSE  MG TWICE DAILY 23   Ar Automatic Reconciliation   mycophenolate (CELLCEPT) 500 MG tablet Take 500 mg by mouth 2 times daily  Patient not taking: Reported on 2023   Ar Automatic Reconciliation   omeprazole (PRILOSEC) 40 MG delayed release capsule Take by mouth daily    Ar Automatic Reconciliation   tocilizumab (ACTEMRA ACTPEN) 162 MG/0.9ML SOAJ injection Inject 0.9 mLs into the skin once a week 3/15/23   Ar Automatic Reconciliation       Current medications:    Current Facility-Administered Medications   Medication Dose Route Frequency

## 2023-07-10 NOTE — PERIOP NOTE
Endoscope was pre-cleaned at bedside immediately following procedure by Gene. Assumed pt care taken to recovery via stretcher for further monitoring please see CRNA chart sedation/monitoring for procedure pt tolerated well.

## 2023-07-10 NOTE — CARE COORDINATION
7/10/2023   CARE MANAGEMENT NOTE:  CM reviewed EMR and handoff received from weekend  Mark Stack). Pt was admitted with GIB. Reportedly, pt resides with his wife. RUR 8%    Transition Plan of Care:  GI is following for medical management - pt is s/p EGD  Plan is for pt to return home with his wife  Outpatient follow up  Wife will transport pt home    CM will continue to follow pt until discharged.   Abbey

## 2023-07-10 NOTE — PROGRESS NOTES
Patient traveled back to room with Tele leads attached but box was left in room. Transport aware to call nurse to attach upon arrived to room.

## 2023-07-10 NOTE — ANESTHESIA POSTPROCEDURE EVALUATION
Department of Anesthesiology  Postprocedure Note    Patient: Marilee Gomez  MRN: [id number]  YOB: 1960  Date of evaluation: 7/10/2023      Procedure Summary     Date: 07/10/23 Room / Location: [facility] 03 / [facility] ENDOSCOPY    Anesthesia Start: 1215 Anesthesia Stop: 1233    Procedure: EGD ESOPHAGOGASTRODUODENOSCOPY (Upper GI Region) Diagnosis:       Hematemesis, unspecified whether nausea present      (Hematemesis, unspecified whether nausea present [K92.0])    Surgeons: Bassem Bragg MD Responsible Provider: Sim Moncada MD    Anesthesia Type: MAC ASA Status: 2          Anesthesia Type: No value filed.     Aiden Phase I: Aiden Score: 10    Aiden Phase II:        Anesthesia Post Evaluation    Patient location during evaluation: bedside  Patient participation: complete - patient participated  Level of consciousness: awake  Pain score: 0  Airway patency: fixed obstruction  Nausea & Vomiting: no nausea and no vomiting  Complications: no  Cardiovascular status: blood pressure returned to baseline  Respiratory status: acceptable  Hydration status: euvolemic

## 2023-07-10 NOTE — PERIOP NOTE
TRANSFER - IN REPORT:    Verbal report received from Lg on Salazar Lecher  being received from 5th floor 526 for ordered procedure      Report consisted of patient's Situation, Background, Assessment and   Recommendations(SBAR). Information from the following report(s) Procedure Verification was reviewed with the receiving nurse. Opportunity for questions and clarification was provided. Assessment completed upon patient's arrival to unit and care assumed.

## 2023-07-14 ENCOUNTER — OFFICE VISIT (OUTPATIENT)
Age: 63
End: 2023-07-14
Payer: COMMERCIAL

## 2023-07-14 VITALS
RESPIRATION RATE: 16 BRPM | DIASTOLIC BLOOD PRESSURE: 89 MMHG | OXYGEN SATURATION: 92 % | SYSTOLIC BLOOD PRESSURE: 127 MMHG | WEIGHT: 151 LBS | HEART RATE: 90 BPM | TEMPERATURE: 97.4 F | BODY MASS INDEX: 22.3 KG/M2

## 2023-07-14 DIAGNOSIS — M34.9 SYSTEMIC SCLEROSIS, UNSPECIFIED (HCC): Primary | ICD-10-CM

## 2023-07-14 PROCEDURE — G8427 DOCREV CUR MEDS BY ELIG CLIN: HCPCS | Performed by: PEDIATRICS

## 2023-07-14 PROCEDURE — 1036F TOBACCO NON-USER: CPT | Performed by: PEDIATRICS

## 2023-07-14 PROCEDURE — G8420 CALC BMI NORM PARAMETERS: HCPCS | Performed by: PEDIATRICS

## 2023-07-14 PROCEDURE — 99215 OFFICE O/P EST HI 40 MIN: CPT | Performed by: PEDIATRICS

## 2023-07-14 PROCEDURE — 3017F COLORECTAL CA SCREEN DOC REV: CPT | Performed by: PEDIATRICS

## 2023-07-14 RX ORDER — TACROLIMUS 1 MG/G
OINTMENT TOPICAL
COMMUNITY
Start: 2023-06-27

## 2023-07-14 ASSESSMENT — PATIENT HEALTH QUESTIONNAIRE - PHQ9
SUM OF ALL RESPONSES TO PHQ QUESTIONS 1-9: 0
1. LITTLE INTEREST OR PLEASURE IN DOING THINGS: 0
SUM OF ALL RESPONSES TO PHQ9 QUESTIONS 1 & 2: 0
2. FEELING DOWN, DEPRESSED OR HOPELESS: 0

## 2023-07-18 ENCOUNTER — HOSPITAL ENCOUNTER (OUTPATIENT)
Facility: HOSPITAL | Age: 63
Discharge: HOME | End: 2023-07-21
Attending: PEDIATRICS
Payer: COMMERCIAL

## 2023-07-18 DIAGNOSIS — J84.9 ILD (INTERSTITIAL LUNG DISEASE) (HCC): ICD-10-CM

## 2023-07-18 DIAGNOSIS — M34.9 SYSTEMIC SCLEROSIS, UNSPECIFIED (HCC): ICD-10-CM

## 2023-07-18 PROCEDURE — 71250 CT THORAX DX C-: CPT

## 2023-07-20 ENCOUNTER — TELEPHONE (OUTPATIENT)
Age: 63
End: 2023-07-20

## 2023-07-20 DIAGNOSIS — M34.9 SCLERODERMA (HCC): ICD-10-CM

## 2023-07-20 DIAGNOSIS — M33.91 DERMATOMYOSITIS AFFECTING RESPIRATORY SYSTEM (HCC): ICD-10-CM

## 2023-07-21 DIAGNOSIS — M34.9 SCLERODERMA (HCC): Primary | ICD-10-CM

## 2023-07-21 DIAGNOSIS — M33.91 DERMATOMYOSITIS AFFECTING RESPIRATORY SYSTEM (HCC): ICD-10-CM

## 2023-07-21 RX ORDER — SODIUM CHLORIDE 9 MG/ML
5-250 INJECTION, SOLUTION INTRAVENOUS PRN
OUTPATIENT
Start: 2023-07-21

## 2023-07-21 RX ORDER — ACETAMINOPHEN 325 MG/1
650 TABLET ORAL ONCE
OUTPATIENT
Start: 2023-07-21 | End: 2023-07-21

## 2023-07-21 RX ORDER — MEPERIDINE HYDROCHLORIDE 25 MG/ML
12.5 INJECTION INTRAMUSCULAR; INTRAVENOUS; SUBCUTANEOUS PRN
OUTPATIENT
Start: 2023-07-21

## 2023-07-21 RX ORDER — DIPHENHYDRAMINE HYDROCHLORIDE 50 MG/ML
50 INJECTION INTRAMUSCULAR; INTRAVENOUS ONCE
OUTPATIENT
Start: 2023-07-21 | End: 2023-07-21

## 2023-07-21 RX ORDER — DIPHENHYDRAMINE HYDROCHLORIDE 50 MG/ML
50 INJECTION INTRAMUSCULAR; INTRAVENOUS
OUTPATIENT
Start: 2023-07-21

## 2023-07-21 RX ORDER — SODIUM CHLORIDE 9 MG/ML
INJECTION, SOLUTION INTRAVENOUS CONTINUOUS
OUTPATIENT
Start: 2023-07-21

## 2023-07-21 RX ORDER — SODIUM CHLORIDE 0.9 % (FLUSH) 0.9 %
5-40 SYRINGE (ML) INJECTION PRN
OUTPATIENT
Start: 2023-07-21

## 2023-07-21 RX ORDER — HEPARIN 100 UNIT/ML
500 SYRINGE INTRAVENOUS PRN
OUTPATIENT
Start: 2023-07-21

## 2023-07-21 RX ORDER — ACETAMINOPHEN 325 MG/1
650 TABLET ORAL
OUTPATIENT
Start: 2023-07-21

## 2023-07-21 RX ORDER — ONDANSETRON 2 MG/ML
8 INJECTION INTRAMUSCULAR; INTRAVENOUS
OUTPATIENT
Start: 2023-07-21

## 2023-07-21 RX ORDER — ALBUTEROL SULFATE 90 UG/1
4 AEROSOL, METERED RESPIRATORY (INHALATION) PRN
OUTPATIENT
Start: 2023-07-21

## 2023-07-21 RX ORDER — EPINEPHRINE 1 MG/ML
0.3 INJECTION, SOLUTION, CONCENTRATE INTRAVENOUS PRN
OUTPATIENT
Start: 2023-07-21

## 2023-07-28 ENCOUNTER — TELEPHONE (OUTPATIENT)
Age: 63
End: 2023-07-28

## 2023-07-28 NOTE — TELEPHONE ENCOUNTER
Zeb from [facility] called and stated called because she needed to confirm the PT's known allergies and phone number because they haven't been able to reach the PT and couldn't leave a VM .  I confirmed the number and that the PT has no known allergies

## 2023-08-09 ENCOUNTER — TELEPHONE (OUTPATIENT)
Age: 63
End: 2023-08-09

## 2023-08-09 NOTE — TELEPHONE ENCOUNTER
Patient states he was approved to get Rituximab injections and would like to know if he can get them done here.  Please advise [phone]

## 2023-08-09 NOTE — TELEPHONE ENCOUNTER
Replied to patient's [employer] message regarding infusions. He has been approved for Home infusions with [facility]. PDF Authorization imported to patient's media.

## 2023-08-14 ENCOUNTER — TELEPHONE (OUTPATIENT)
Age: 63
End: 2023-08-14

## 2023-08-14 NOTE — TELEPHONE ENCOUNTER
Zeb from [facility] called and stated that they still need the order forms filled out by Vince Rear were re faxed today.  Rep stated that the PT's appointment  is tomorrow so it would need to be filled out and faxed back before 3:30    [phone]

## 2023-08-15 ENCOUNTER — TELEPHONE (OUTPATIENT)
Age: 63
End: 2023-08-15

## 2023-08-15 NOTE — TELEPHONE ENCOUNTER
Nidhi Maravilla from [facility] home infusion left a VM on behalf of the PT stating that they can't infuse at home without pre med orders.      C:[phone]

## 2023-08-16 ENCOUNTER — TELEPHONE (OUTPATIENT)
Age: 63
End: 2023-08-16

## 2023-08-16 NOTE — TELEPHONE ENCOUNTER
Amanda Joshua from [facility] home infusion called regarding last encounter, I informed him per Yuan Chandler that We can add the orders. Just need the form and he stated he understood.  I requested the form be re faxed and we confirmed the office fax number

## 2023-08-16 NOTE — TELEPHONE ENCOUNTER
Spoke to rep from [address] informed rep per Dr Real Fraire message below   We can add the orders. Just need the form.     Rep verbally acknowledged understanding and stated that he will fax over the form

## 2023-08-16 NOTE — TELEPHONE ENCOUNTER
Zeb from [facility] called and stated that they need premed orders because the PT has already been rescheduled twice, they're requesting MG verification on Methylprednisolone, generic benadryl and  acetaminophen.  I requested for needed information to be re faxed and we confirmed the fax number

## 2023-08-21 ENCOUNTER — TELEPHONE (OUTPATIENT)
Age: 63
End: 2023-08-21

## 2023-09-19 ENCOUNTER — TELEPHONE (OUTPATIENT)
Age: 63
End: 2023-09-19

## 2023-09-19 NOTE — TELEPHONE ENCOUNTER
PT's wife called and stated that the PT is having side effects from Rituximab, wife stated that they began this Friday and have continued to today. Wife stated that the PT is suffering from body aches, fevers, he can't move his fingers and arms, pain in muscles, weakness (can't stand or walk) and fatigue. Wife said that they took a COVID test and it was negative, wife would like a call back and provided her number.      P:[phone]

## 2023-09-19 NOTE — TELEPHONE ENCOUNTER
Maria M,  a pharmacist from [facility] called and requested  a call back from the nurse regarding the PT's Ruxience, they'd like to know if there will be a maintenance dose or is he done with the therapy.      R:[phone]

## 2023-09-20 NOTE — TELEPHONE ENCOUNTER
Left voicemail to have patient or spouse give the office a return call regarding Dr Dimitri Mcfadden message below   When did he do rituximab. Those sound a little different than normal RTX adverse effects.

## 2023-09-21 NOTE — TELEPHONE ENCOUNTER
Spoke to [facility] informed Maria M per Dr Rochelle Brady message below   For now we will plan on continuing the treatment.     Maria M verbally acknowledged understanding and stated that she will need to the orders sent over for the every 6 months infusions

## 2023-09-22 ENCOUNTER — TELEPHONE (OUTPATIENT)
Age: 63
End: 2023-09-22

## 2023-09-22 NOTE — TELEPHONE ENCOUNTER
PT's wife called and requested to speak with [facility] regarding the PT's symptoms worsening.  Wife stated that the PT is dealing with his feet being tingly and cold and blisters in mouth

## 2023-09-25 NOTE — TELEPHONE ENCOUNTER
PT's wife called because she'd like to speak with Mesha or preferably  regarding the PT being hospitalized.

## 2023-10-18 ENCOUNTER — HOSPITAL ENCOUNTER (OUTPATIENT)
Facility: HOSPITAL | Age: 63
Setting detail: SPECIMEN
Discharge: HOME | End: 2023-10-21

## 2023-10-18 LAB — POTASSIUM SERPL-SCNC: 4.1 MMOL/L (ref 3.5–5.1)

## 2023-10-20 ENCOUNTER — TELEPHONE (OUTPATIENT)
Age: 63
End: 2023-10-20

## 2023-10-20 NOTE — TELEPHONE ENCOUNTER
Pt wife came and she wanted to fill out a medical release form on behalf of her  and I informed her that she will not be able to because it goes against HIPPA rules and that her  will have to do it himself. I then informed her that him can go on our web site and pull up the form and fill it out that way and it will just go straight to [employer] which is our 3 rd party company that will gather all that information and send it to the new doctor that her  will be seeing next. I gave all the directions on how to fill out that form. And she verbally acknowledged that she understood all the directions that I gave her. 10/20/23

## 2023-10-27 ENCOUNTER — TELEPHONE (OUTPATIENT)
Age: 63
End: 2023-10-27

## 2023-10-27 NOTE — TELEPHONE ENCOUNTER
Pt called and wanted to know why he was sent a no show letter, and I advised him , it because he did not show up for his appt and the office was concerned about him and why he did not show. He mentioned that he was thinking of getting another doctor and I advised him to fill out a medical release foam and I told him that he can go on line and fill it out that way and he suggested that he will give it a thought.  10/27/23

## (undated) DEVICE — ELECTRODE,RADIOTRANSLUCENT,FOAM,3PK: Brand: MEDLINE

## (undated) DEVICE — KIT COLON W/ 1.1OZ LUB AND 2 END

## (undated) DEVICE — SOLIDIFIER FLD 2OZ 1500CC N DISINF IN BTL DISP SAFESORB

## (undated) DEVICE — 1200 GUARD II KIT W/5MM TUBE W/O VAC TUBE: Brand: GUARDIAN

## (undated) DEVICE — LINE FILTER NASAL CANNULA SHORT TERM ADULT 6.5

## (undated) DEVICE — BLUNTFILL: Brand: MONOJECT

## (undated) DEVICE — IV STRT KT 3282] LSL INDUSTRIES INC]

## (undated) DEVICE — SYRINGE MED 5ML STD CLR PLAS LUERLOCK TIP N CTRL DISP

## (undated) DEVICE — CATHETER IV 22GA L1IN OD0.8382-0.9144MM ID0.6096-0.6858MM

## (undated) DEVICE — SET ADMIN 16ML TBNG L100IN 2 Y INJ SITE IV PIGGY BK DISP (ORDER IN MULIPLES OF 48)

## (undated) DEVICE — SYRINGE MED 3ML CLR PLAS STD N CTRL LUERLOCK TIP DISP

## (undated) DEVICE — BLUNTFILL WITH FILTER: Brand: MONOJECT

## (undated) DEVICE — BITEBLOCK ENDOSCP 60FR MAXI WHT POLYETH STURDY W/ VELC WVN

## (undated) DEVICE — CANNULA CUSH AD W/ 14FT TBG

## (undated) DEVICE — BASIN EMSIS 16OZ GRAPHITE PLAS KID SHP MOLD GRAD FOR ORAL